# Patient Record
Sex: MALE | Race: OTHER | HISPANIC OR LATINO | URBAN - METROPOLITAN AREA
[De-identification: names, ages, dates, MRNs, and addresses within clinical notes are randomized per-mention and may not be internally consistent; named-entity substitution may affect disease eponyms.]

---

## 2017-09-07 ENCOUNTER — EMERGENCY (EMERGENCY)
Facility: HOSPITAL | Age: 42
LOS: 1 days | Discharge: HOME | End: 2017-09-07
Admitting: EMERGENCY MEDICINE
Payer: COMMERCIAL

## 2017-09-07 VITALS
OXYGEN SATURATION: 98 % | DIASTOLIC BLOOD PRESSURE: 73 MMHG | RESPIRATION RATE: 17 BRPM | SYSTOLIC BLOOD PRESSURE: 120 MMHG | TEMPERATURE: 98 F | HEART RATE: 72 BPM

## 2017-09-07 VITALS
SYSTOLIC BLOOD PRESSURE: 114 MMHG | OXYGEN SATURATION: 99 % | HEIGHT: 65 IN | TEMPERATURE: 98 F | HEART RATE: 65 BPM | WEIGHT: 164.91 LBS | RESPIRATION RATE: 14 BRPM | DIASTOLIC BLOOD PRESSURE: 67 MMHG
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
## 2017-09-07 DIAGNOSIS — Z98.890 OTHER SPECIFIED POSTPROCEDURAL STATES: Chronic | ICD-10-CM

## 2017-09-07 DIAGNOSIS — Z79.899 OTHER LONG TERM (CURRENT) DRUG THERAPY: ICD-10-CM

## 2017-09-07 DIAGNOSIS — M54.5 LOW BACK PAIN: ICD-10-CM
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
## 2017-09-07 DIAGNOSIS — S09.90XA UNSPECIFIED INJURY OF HEAD, INITIAL ENCOUNTER: ICD-10-CM

## 2017-09-07 DIAGNOSIS — Z79.1 LONG TERM (CURRENT) USE OF NON-STEROIDAL ANTI-INFLAMMATORIES (NSAID): ICD-10-CM
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
## 2017-09-07 PROCEDURE — 70450 CT HEAD/BRAIN W/O DYE: CPT | Mod: 26

## 2017-09-07 PROCEDURE — 99284 EMERGENCY DEPT VISIT MOD MDM: CPT | Mod: 25

## 2017-09-07 PROCEDURE — 96372 THER/PROPH/DIAG INJ SC/IM: CPT

## 2017-09-07 PROCEDURE — 70450 CT HEAD/BRAIN W/O DYE: CPT

## 2017-09-07 PROCEDURE — 99284 EMERGENCY DEPT VISIT MOD MDM: CPT

## 2017-09-07 RX ORDER — KETOROLAC TROMETHAMINE 30 MG/ML
60 SYRINGE (ML) INJECTION ONCE
Qty: 0 | Refills: 0 | Status: DISCONTINUED | OUTPATIENT
Start: 2017-09-07 | End: 2017-09-07

## 2017-09-07 RX ORDER — IBUPROFEN 200 MG
1 TABLET ORAL
Qty: 30 | Refills: 0
Start: 2017-09-07

## 2017-09-07 RX ADMIN — Medication 60 MILLIGRAM(S): at 20:07

## 2017-09-07 NOTE — ED PROVIDER NOTE - OBJECTIVE STATEMENT
43 y/o m hx TBI, seizures presents stating he tripped and fell backward today, hitting back of head on the ground.  Pt also stating he hit right jaw and has pain in left low back with movement.  Pt girlfriend stating his speech seemed different initially after fall, although now is normal.   Denies LOC, impaired gait, numbness/tingling, weakness, visual changes, all other ROS negative.
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
## 2017-09-07 NOTE — ED ADULT NURSE NOTE - CHPI ED SYMPTOMS NEG
no loss of consciousness/no dizziness/no change in level of consciousness/no fever/no weakness/no vomiting/no nausea/no confusion/no numbness/no blurred vision
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
## 2017-09-07 NOTE — ED PROVIDER NOTE - ENMT, MLM
Airway patent, Nasal mucosa clear. Mouth with normal mucosa. Throat has no vesicles, no oropharyngeal exudates and uvula is midline.  No dental tenderness, bite test neg
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
## 2018-08-21 ENCOUNTER — EMERGENCY (EMERGENCY)
Facility: HOSPITAL | Age: 43
LOS: 1 days | Discharge: HOME | End: 2018-08-21
Admitting: EMERGENCY MEDICINE
Payer: COMMERCIAL

## 2018-08-21 VITALS
TEMPERATURE: 98 F | HEART RATE: 70 BPM | RESPIRATION RATE: 16 BRPM | OXYGEN SATURATION: 99 % | DIASTOLIC BLOOD PRESSURE: 70 MMHG | SYSTOLIC BLOOD PRESSURE: 115 MMHG

## 2018-08-21 VITALS
TEMPERATURE: 98 F | HEART RATE: 77 BPM | DIASTOLIC BLOOD PRESSURE: 67 MMHG | RESPIRATION RATE: 17 BRPM | OXYGEN SATURATION: 98 % | SYSTOLIC BLOOD PRESSURE: 120 MMHG

## 2018-08-21 DIAGNOSIS — M79.671 PAIN IN RIGHT FOOT: ICD-10-CM

## 2018-08-21 DIAGNOSIS — Z23 ENCOUNTER FOR IMMUNIZATION: ICD-10-CM
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
## 2018-08-21 DIAGNOSIS — Z98.890 OTHER SPECIFIED POSTPROCEDURAL STATES: Chronic | ICD-10-CM
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
## 2018-08-21 DIAGNOSIS — S90.851A SUPERFICIAL FOREIGN BODY, RIGHT FOOT, INITIAL ENCOUNTER: ICD-10-CM
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
## 2018-08-21 DIAGNOSIS — Z79.1 LONG TERM (CURRENT) USE OF NON-STEROIDAL ANTI-INFLAMMATORIES (NSAID): ICD-10-CM

## 2018-08-21 DIAGNOSIS — Z79.899 OTHER LONG TERM (CURRENT) DRUG THERAPY: ICD-10-CM

## 2018-08-21 DIAGNOSIS — L08.9 LOCAL INFECTION OF THE SKIN AND SUBCUTANEOUS TISSUE, UNSPECIFIED: ICD-10-CM
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
## 2018-08-21 PROCEDURE — 73630 X-RAY EXAM OF FOOT: CPT | Mod: 26,RT

## 2018-08-21 PROCEDURE — 90471 IMMUNIZATION ADMIN: CPT

## 2018-08-21 PROCEDURE — 28190 REMOVAL OF FOOT FOREIGN BODY: CPT | Mod: RT

## 2018-08-21 PROCEDURE — 99284 EMERGENCY DEPT VISIT MOD MDM: CPT | Mod: 25

## 2018-08-21 PROCEDURE — 90715 TDAP VACCINE 7 YRS/> IM: CPT

## 2018-08-21 PROCEDURE — 99283 EMERGENCY DEPT VISIT LOW MDM: CPT | Mod: 25

## 2018-08-21 PROCEDURE — 73630 X-RAY EXAM OF FOOT: CPT

## 2018-08-21 RX ORDER — CEPHALEXIN 500 MG
1 CAPSULE ORAL
Qty: 21 | Refills: 0 | OUTPATIENT
Start: 2018-08-21 | End: 2018-08-27

## 2018-08-21 RX ORDER — TETANUS TOXOID, REDUCED DIPHTHERIA TOXOID AND ACELLULAR PERTUSSIS VACCINE, ADSORBED 5; 2.5; 8; 8; 2.5 [IU]/.5ML; [IU]/.5ML; UG/.5ML; UG/.5ML; UG/.5ML
0.5 SUSPENSION INTRAMUSCULAR ONCE
Qty: 0 | Refills: 0 | Status: COMPLETED | OUTPATIENT
Start: 2018-08-21 | End: 2018-08-21

## 2018-08-21 RX ORDER — CEPHALEXIN 500 MG
500 CAPSULE ORAL ONCE
Qty: 0 | Refills: 0 | Status: COMPLETED | OUTPATIENT
Start: 2018-08-21 | End: 2018-08-21

## 2018-08-21 RX ADMIN — Medication 500 MILLIGRAM(S): at 10:35

## 2018-08-21 RX ADMIN — TETANUS TOXOID, REDUCED DIPHTHERIA TOXOID AND ACELLULAR PERTUSSIS VACCINE, ADSORBED 0.5 MILLILITER(S): 5; 2.5; 8; 8; 2.5 SUSPENSION INTRAMUSCULAR at 09:13

## 2018-08-21 NOTE — ED PROVIDER NOTE - PSH
History of brain surgery Repair Performed By Another Provider Text (Leave Blank If You Do Not Want): After obtaining clear surgical margins the defect was repaired by another provider.

## 2018-08-21 NOTE — CONSULT NOTE ADULT - ASSESSMENT
A/P 42 yo M w PMHx seizures presents to ED with foreign body (glass)    - Assessed for area with maximum tenderness consistent with radiographic evidence of foreign body   - Area was anesthetized with 1% Lidocaine Plain in a ring block fashion  - Sterile field was set up around the patient's affected right foot  - Anesthetic was checked prior to commencement of procedure  - A Sterile #15 blade was utilized, making a small linear stab incision   - The glass was expressed from the superficial soft tissue   - No purulence was expressed from the surrounding area  - Right foot dressed in sterile gauze and Kerlix   - Dispensed surgical shoe to temporarily offload the area   - Patient tolerated procedure well  - Patient to follow up on outpatient basis in 1 week  - This will be arranged prior to discharge to find podiatrist   that accepts his insurance ([facility])
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
## 2018-08-21 NOTE — ED PROVIDER NOTE - MEDICAL DECISION MAKING DETAILS
Patient with right foot FB removed by podiatry and placed on oral abx therapy. Td was updated. Recommend f/u at podiatry clinic.

## 2018-08-21 NOTE — ED ADULT NURSE NOTE - OBJECTIVE STATEMENT
42 y/o male c/o right foot pain. pt reports stepping on glass. pt is observed to have bleb on bottom of right foot. Pt no active bleeding noted. Pt. speaks clear, MAEx4, has unlabored breathing. Abd soft nt nd. Skin dry, warm.

## 2018-08-21 NOTE — ED PROVIDER NOTE - PHYSICAL EXAMINATION
right foot + lateral aspect + mild swelling with localized erythema, No motor or sensory deficit, NVI. FROM

## 2018-08-21 NOTE — ED ADULT NURSE NOTE - NSIMPLEMENTINTERV_GEN_ALL_ED
Implemented All Universal Safety Interventions:  [place] to call system. Call bell, personal items and telephone within reach. Instruct patient to call for assistance. Room bathroom lighting operational. Non-slip footwear when patient is off stretcher. Physically safe environment: no spills, clutter or unnecessary equipment. Stretcher in lowest position, wheels locked, appropriate side rails in place.
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
## 2018-09-28 ENCOUNTER — EMERGENCY (EMERGENCY)
Facility: HOSPITAL | Age: 43
LOS: 1 days | Discharge: HOME | End: 2018-09-28
Attending: EMERGENCY MEDICINE | Admitting: EMERGENCY MEDICINE
Payer: COMMERCIAL

## 2018-09-28 VITALS
RESPIRATION RATE: 18 BRPM | TEMPERATURE: 98 F | HEART RATE: 75 BPM | SYSTOLIC BLOOD PRESSURE: 131 MMHG | WEIGHT: 159.39 LBS | HEIGHT: 65 IN | DIASTOLIC BLOOD PRESSURE: 82 MMHG | OXYGEN SATURATION: 97 %

## 2018-09-28 VITALS
DIASTOLIC BLOOD PRESSURE: 75 MMHG | HEART RATE: 72 BPM | RESPIRATION RATE: 16 BRPM | OXYGEN SATURATION: 94 % | TEMPERATURE: 98 F | SYSTOLIC BLOOD PRESSURE: 112 MMHG

## 2018-09-28 DIAGNOSIS — Z98.890 OTHER SPECIFIED POSTPROCEDURAL STATES: Chronic | ICD-10-CM

## 2018-09-28 LAB
ALBUMIN SERPL ELPH-MCNC: 4.7 G/DL — SIGNIFICANT CHANGE UP (ref 3.3–5)
ALP SERPL-CCNC: 97 U/L — SIGNIFICANT CHANGE UP (ref 40–120)
ALT FLD-CCNC: 20 U/L — SIGNIFICANT CHANGE UP (ref 10–45)
ANION GAP SERPL CALC-SCNC: 12 MMOL/L — SIGNIFICANT CHANGE UP (ref 5–17)
APPEARANCE UR: CLEAR — SIGNIFICANT CHANGE UP
AST SERPL-CCNC: 20 U/L — SIGNIFICANT CHANGE UP (ref 10–40)
BASOPHILS NFR BLD AUTO: 0 % — SIGNIFICANT CHANGE UP (ref 0–2)
BILIRUB SERPL-MCNC: 0.4 MG/DL — SIGNIFICANT CHANGE UP (ref 0.2–1.2)
BILIRUB UR-MCNC: NEGATIVE — SIGNIFICANT CHANGE UP
BUN SERPL-MCNC: 10 MG/DL — SIGNIFICANT CHANGE UP (ref 7–23)
CALCIUM SERPL-MCNC: 9.5 MG/DL — SIGNIFICANT CHANGE UP (ref 8.4–10.5)
CARBAMAZEPINE SERPL-MCNC: 10.3 UG/ML — SIGNIFICANT CHANGE UP (ref 4–12)
CHLORIDE SERPL-SCNC: 98 MMOL/L — SIGNIFICANT CHANGE UP (ref 96–108)
CHOLEST SERPL-MCNC: 167 MG/DL — SIGNIFICANT CHANGE UP (ref 10–199)
CO2 SERPL-SCNC: 26 MMOL/L — SIGNIFICANT CHANGE UP (ref 22–31)
COLOR SPEC: YELLOW — SIGNIFICANT CHANGE UP
CREAT SERPL-MCNC: 0.71 MG/DL — SIGNIFICANT CHANGE UP (ref 0.5–1.3)
DIFF PNL FLD: NEGATIVE — SIGNIFICANT CHANGE UP
EOSINOPHIL NFR BLD AUTO: 2 % — SIGNIFICANT CHANGE UP (ref 0–6)
GLUCOSE SERPL-MCNC: 112 MG/DL — HIGH (ref 70–99)
GLUCOSE UR QL: NEGATIVE — SIGNIFICANT CHANGE UP
HCT VFR BLD CALC: 42.8 % — SIGNIFICANT CHANGE UP (ref 39–50)
HDLC SERPL-MCNC: 49 MG/DL — SIGNIFICANT CHANGE UP
HGB BLD-MCNC: 14.8 G/DL — SIGNIFICANT CHANGE UP (ref 13–17)
KETONES UR-MCNC: NEGATIVE — SIGNIFICANT CHANGE UP
LEUKOCYTE ESTERASE UR-ACNC: NEGATIVE — SIGNIFICANT CHANGE UP
LIPID PNL WITH DIRECT LDL SERPL: 85 MG/DL — SIGNIFICANT CHANGE UP
LYMPHOCYTES # BLD AUTO: 24 % — SIGNIFICANT CHANGE UP (ref 13–44)
MCHC RBC-ENTMCNC: 30.7 PG — SIGNIFICANT CHANGE UP (ref 27–34)
MCHC RBC-ENTMCNC: 34.6 G/DL — SIGNIFICANT CHANGE UP (ref 32–36)
MCV RBC AUTO: 88.8 FL — SIGNIFICANT CHANGE UP (ref 80–100)
MONOCYTES NFR BLD AUTO: 1 % — LOW (ref 2–14)
NEUTROPHILS NFR BLD AUTO: 66 % — SIGNIFICANT CHANGE UP (ref 43–77)
NITRITE UR-MCNC: NEGATIVE — SIGNIFICANT CHANGE UP
PH UR: 6.5 — SIGNIFICANT CHANGE UP (ref 5–8)
PHENOBARB SERPL-MCNC: 21.1 UG/ML — SIGNIFICANT CHANGE UP (ref 15–40)
PLATELET # BLD AUTO: 280 K/UL — SIGNIFICANT CHANGE UP (ref 150–400)
POTASSIUM SERPL-MCNC: 4 MMOL/L — SIGNIFICANT CHANGE UP (ref 3.5–5.3)
POTASSIUM SERPL-SCNC: 4 MMOL/L — SIGNIFICANT CHANGE UP (ref 3.5–5.3)
PROT SERPL-MCNC: 8.2 G/DL — SIGNIFICANT CHANGE UP (ref 6–8.3)
PROT UR-MCNC: NEGATIVE MG/DL — SIGNIFICANT CHANGE UP
RBC # BLD: 4.82 M/UL — SIGNIFICANT CHANGE UP (ref 4.2–5.8)
RBC # FLD: 12.8 % — SIGNIFICANT CHANGE UP (ref 10.3–16.9)
SODIUM SERPL-SCNC: 136 MMOL/L — SIGNIFICANT CHANGE UP (ref 135–145)
SP GR SPEC: <=1.005 — SIGNIFICANT CHANGE UP (ref 1–1.03)
TOTAL CHOLESTEROL/HDL RATIO MEASUREMENT: 3.4 RATIO — SIGNIFICANT CHANGE UP (ref 3.4–9.6)
TRIGL SERPL-MCNC: 167 MG/DL — HIGH (ref 10–149)
TROPONIN T SERPL-MCNC: <0.01 NG/ML — SIGNIFICANT CHANGE UP (ref 0–0.01)
UROBILINOGEN FLD QL: 0.2 E.U./DL — SIGNIFICANT CHANGE UP
WBC # BLD: 5.1 K/UL — SIGNIFICANT CHANGE UP (ref 3.8–10.5)
WBC # FLD AUTO: 5.1 K/UL — SIGNIFICANT CHANGE UP (ref 3.8–10.5)

## 2018-09-28 PROCEDURE — 70450 CT HEAD/BRAIN W/O DYE: CPT

## 2018-09-28 PROCEDURE — 80156 ASSAY CARBAMAZEPINE TOTAL: CPT

## 2018-09-28 PROCEDURE — 93971 EXTREMITY STUDY: CPT

## 2018-09-28 PROCEDURE — 80061 LIPID PANEL: CPT

## 2018-09-28 PROCEDURE — 81003 URINALYSIS AUTO W/O SCOPE: CPT

## 2018-09-28 PROCEDURE — 70496 CT ANGIOGRAPHY HEAD: CPT | Mod: 26

## 2018-09-28 PROCEDURE — 71045 X-RAY EXAM CHEST 1 VIEW: CPT

## 2018-09-28 PROCEDURE — 73502 X-RAY EXAM HIP UNI 2-3 VIEWS: CPT

## 2018-09-28 PROCEDURE — 99285 EMERGENCY DEPT VISIT HI MDM: CPT | Mod: 25

## 2018-09-28 PROCEDURE — 93971 EXTREMITY STUDY: CPT | Mod: 26,RT

## 2018-09-28 PROCEDURE — 80184 ASSAY OF PHENOBARBITAL: CPT

## 2018-09-28 PROCEDURE — 36415 COLL VENOUS BLD VENIPUNCTURE: CPT

## 2018-09-28 PROCEDURE — 93926 LOWER EXTREMITY STUDY: CPT

## 2018-09-28 PROCEDURE — 70498 CT ANGIOGRAPHY NECK: CPT | Mod: 26

## 2018-09-28 PROCEDURE — 71045 X-RAY EXAM CHEST 1 VIEW: CPT | Mod: 26

## 2018-09-28 PROCEDURE — 70450 CT HEAD/BRAIN W/O DYE: CPT | Mod: 26,59

## 2018-09-28 PROCEDURE — 93926 LOWER EXTREMITY STUDY: CPT | Mod: 26,RT

## 2018-09-28 PROCEDURE — 73502 X-RAY EXAM HIP UNI 2-3 VIEWS: CPT | Mod: 26,RT

## 2018-09-28 PROCEDURE — 0042T: CPT

## 2018-09-28 PROCEDURE — 93010 ELECTROCARDIOGRAM REPORT: CPT

## 2018-09-28 PROCEDURE — 85025 COMPLETE CBC W/AUTO DIFF WBC: CPT

## 2018-09-28 PROCEDURE — 84484 ASSAY OF TROPONIN QUANT: CPT

## 2018-09-28 PROCEDURE — 87086 URINE CULTURE/COLONY COUNT: CPT

## 2018-09-28 PROCEDURE — 99284 EMERGENCY DEPT VISIT MOD MDM: CPT | Mod: 25

## 2018-09-28 PROCEDURE — 70496 CT ANGIOGRAPHY HEAD: CPT

## 2018-09-28 PROCEDURE — 82962 GLUCOSE BLOOD TEST: CPT

## 2018-09-28 PROCEDURE — 70498 CT ANGIOGRAPHY NECK: CPT

## 2018-09-28 PROCEDURE — 93005 ELECTROCARDIOGRAM TRACING: CPT

## 2018-09-28 PROCEDURE — 80053 COMPREHEN METABOLIC PANEL: CPT
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
## 2018-09-28 NOTE — ED PROVIDER NOTE - PROGRESS NOTE DETAILS
Pt was seen by US chapis ashraf. Pt needs to f/u with podiatry or ortho.   The patient is stable for DC. They were advised to call their PMD for prompt outpatient follow up. Return precautions were discussed. The patient was advised to return to the ER for any concerning or worsening symptoms.

## 2018-09-28 NOTE — ED PROVIDER NOTE - MEDICAL DECISION MAKING DETAILS
Pt seen by stroke team dr. rodriguez who feel presentation and Cts not c/w stroke. Will get RLE US to r/o DVT and vascular was consulted. Also right hip xray ordered as pt c/o h/i hip pain and pain intermittently. Dispo pending.
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
## 2018-09-28 NOTE — CONSULT NOTE ADULT - ASSESSMENT
42 yo M with right lower leg numbness. Right lower extremity if well perfused with palpable pulses. Low suspicion for acute critical limb ischemia given pulses and absence of pain.    Most likely neuropathy 2/2 nerve compression with progressing foot deformity, possible early presentation of Charcot foot.    Pending discussion with attending on call 44 yo M with right lower leg numbness. Right lower extremity if well perfused with palpable pulses. Low suspicion for acute critical limb ischemia given pulses and absence of pain.    Most likely neuropathy 2/2 nerve compression with progressing foot deformity.    Pending discussion with attending on call 42 yo M with right lower leg numbness. Right lower extremity if well perfused with palpable pulses. Low suspicion for acute critical limb ischemia given pulses and absence of pain. Arterial duplex negative for occlusion/disease and venous duplex negative for DVT.    Most likely neuropathy 2/2 nerve compression with progressing foot deformity.    No further vascular intervention indicated at this time  Would recommend podiatry vs ortho consult/follow up for right foot deformity and fitting for better suited, offloading shoe.   Consider neurology follow up for nerve testing.      Case d/w chief on call and attending.

## 2018-09-28 NOTE — ED ADULT NURSE NOTE - NSIMPLEMENTINTERV_GEN_ALL_ED
Implemented All Universal Safety Interventions:  [place] to call system. Call bell, personal items and telephone within reach. Instruct patient to call for assistance. Room bathroom lighting operational. Non-slip footwear when patient is off stretcher. Physically safe environment: no spills, clutter or unnecessary equipment. Stretcher in lowest position, wheels locked, appropriate side rails in place.

## 2018-09-28 NOTE — ED PROVIDER NOTE - CONDUCTED A DETAILED DISCUSSION WITH PATIENT AND/OR GUARDIAN REGARDING, MDM
radiology results/return to ED if symptoms worsen, persist or questions arise/lab results/need for outpatient follow-up 55 years
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
## 2018-09-28 NOTE — ED PROVIDER NOTE - PHYSICAL EXAMINATION
GEN: Well appearing, well nourished, awake, alert, oriented to person, place, time/situation and in no apparent distress.  ENT: Airway patent, Nasal mucosa clear. Mouth with normal mucosa.  EYES: Clear bilaterally.  RESPIRATORY: Breathing comfortably with normal RR.  CARDIAC: Regular rate and rhythm  ABDOMEN: Soft, nontender, +bowel sounds, no rebound, rigidity, or guarding.  MSK: Right UE contraction and RLE baseline deformity, +2 pulses distally, compartments soft.  NEURO: Alert and oriented x 3. Right facial droop (old), decreased stregthj right side and patient reports decreased sensation from knee down on RLE. Pt is ambulatory.  SKIN: Skin normal color for race, warm, dry and intact. No evidence of rash.  PSYCH: Alert and oriented to person, place, time/situation. normal mood and affect. no apparent risk to self or others. GEN: Well appearing, well nourished, awake, alert, oriented to person, place, time/situation and in no apparent distress.  ENT: Airway patent, Nasal mucosa clear. Mouth with normal mucosa.  EYES: Clear bilaterally.  RESPIRATORY: Breathing comfortably with normal RR.  CARDIAC: Regular rate and rhythm  ABDOMEN: Soft, nontender, +bowel sounds, no rebound, rigidity, or guarding.  MSK: Right UE contraction and RLE baseline deformity, +2 pulses distally, compartments soft.  NEURO: Alert and oriented x 3. Right facial droop (old), decreased strength right side and patient reports decreased sensation from knee down on RLE. Pt is ambulatory.  SKIN: Skin normal color for race, warm, dry and intact. No evidence of rash.  PSYCH: Alert and oriented to person, place, time/situation. normal mood and affect. no apparent risk to self or others.
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
## 2018-09-29 LAB
CULTURE RESULTS: SIGNIFICANT CHANGE UP
SPECIMEN SOURCE: SIGNIFICANT CHANGE UP

## 2018-10-02 DIAGNOSIS — R20.0 ANESTHESIA OF SKIN: ICD-10-CM

## 2018-10-02 DIAGNOSIS — Z79.899 OTHER LONG TERM (CURRENT) DRUG THERAPY: ICD-10-CM

## 2018-10-02 DIAGNOSIS — R20.2 PARESTHESIA OF SKIN: ICD-10-CM

## 2018-10-02 DIAGNOSIS — Z79.1 LONG TERM (CURRENT) USE OF NON-STEROIDAL ANTI-INFLAMMATORIES (NSAID): ICD-10-CM

## 2019-01-15 ENCOUNTER — INPATIENT (INPATIENT)
Facility: HOSPITAL | Age: 44
LOS: 0 days | Discharge: HOME | DRG: 101 | End: 2019-01-16
Attending: PSYCHIATRY & NEUROLOGY | Admitting: PSYCHIATRY & NEUROLOGY
Payer: COMMERCIAL

## 2019-01-15 VITALS
TEMPERATURE: 98 F | WEIGHT: 154.98 LBS | SYSTOLIC BLOOD PRESSURE: 128 MMHG | DIASTOLIC BLOOD PRESSURE: 84 MMHG | RESPIRATION RATE: 18 BRPM | OXYGEN SATURATION: 98 % | HEART RATE: 87 BPM

## 2019-01-15 DIAGNOSIS — Z98.890 OTHER SPECIFIED POSTPROCEDURAL STATES: Chronic | ICD-10-CM

## 2019-01-15 DIAGNOSIS — Z91.89 OTHER SPECIFIED PERSONAL RISK FACTORS, NOT ELSEWHERE CLASSIFIED: ICD-10-CM

## 2019-01-15 DIAGNOSIS — R56.9 UNSPECIFIED CONVULSIONS: ICD-10-CM

## 2019-01-15 DIAGNOSIS — R63.8 OTHER SYMPTOMS AND SIGNS CONCERNING FOOD AND FLUID INTAKE: ICD-10-CM

## 2019-01-15 DIAGNOSIS — Z29.9 ENCOUNTER FOR PROPHYLACTIC MEASURES, UNSPECIFIED: ICD-10-CM

## 2019-01-15 LAB
ALBUMIN SERPL ELPH-MCNC: 4.4 G/DL — SIGNIFICANT CHANGE UP (ref 3.3–5)
ALP SERPL-CCNC: SIGNIFICANT CHANGE UP U/L (ref 40–120)
ALT FLD-CCNC: SIGNIFICANT CHANGE UP U/L (ref 10–45)
ANION GAP SERPL CALC-SCNC: 14 MMOL/L — SIGNIFICANT CHANGE UP (ref 5–17)
AST SERPL-CCNC: SIGNIFICANT CHANGE UP U/L (ref 10–40)
BASOPHILS NFR BLD AUTO: 0.2 % — SIGNIFICANT CHANGE UP (ref 0–2)
BILIRUB SERPL-MCNC: 0.2 MG/DL — SIGNIFICANT CHANGE UP (ref 0.2–1.2)
BUN SERPL-MCNC: 6 MG/DL — LOW (ref 7–23)
CALCIUM SERPL-MCNC: 8.8 MG/DL — SIGNIFICANT CHANGE UP (ref 8.4–10.5)
CARBAMAZEPINE SERPL-MCNC: 9.9 UG/ML — SIGNIFICANT CHANGE UP (ref 4–12)
CHLORIDE SERPL-SCNC: 95 MMOL/L — LOW (ref 96–108)
CO2 SERPL-SCNC: 24 MMOL/L — SIGNIFICANT CHANGE UP (ref 22–31)
CREAT SERPL-MCNC: 0.59 MG/DL — SIGNIFICANT CHANGE UP (ref 0.5–1.3)
EOSINOPHIL NFR BLD AUTO: 3.2 % — SIGNIFICANT CHANGE UP (ref 0–6)
GLUCOSE SERPL-MCNC: 98 MG/DL — SIGNIFICANT CHANGE UP (ref 70–99)
HCT VFR BLD CALC: 38.3 % — LOW (ref 39–50)
HGB BLD-MCNC: 13.1 G/DL — SIGNIFICANT CHANGE UP (ref 13–17)
LYMPHOCYTES # BLD AUTO: 20.4 % — SIGNIFICANT CHANGE UP (ref 13–44)
MCHC RBC-ENTMCNC: 30 PG — SIGNIFICANT CHANGE UP (ref 27–34)
MCHC RBC-ENTMCNC: 34.2 G/DL — SIGNIFICANT CHANGE UP (ref 32–36)
MCV RBC AUTO: 87.6 FL — SIGNIFICANT CHANGE UP (ref 80–100)
MONOCYTES NFR BLD AUTO: 7.5 % — SIGNIFICANT CHANGE UP (ref 2–14)
NEUTROPHILS NFR BLD AUTO: 68.7 % — SIGNIFICANT CHANGE UP (ref 43–77)
PHENOBARB SERPL-MCNC: 15.3 UG/ML — SIGNIFICANT CHANGE UP (ref 15–40)
PLATELET # BLD AUTO: 319 K/UL — SIGNIFICANT CHANGE UP (ref 150–400)
POTASSIUM SERPL-MCNC: SIGNIFICANT CHANGE UP MMOL/L (ref 3.5–5.3)
POTASSIUM SERPL-SCNC: SIGNIFICANT CHANGE UP MMOL/L (ref 3.5–5.3)
PROT SERPL-MCNC: 8.2 G/DL — SIGNIFICANT CHANGE UP (ref 6–8.3)
RBC # BLD: 4.37 M/UL — SIGNIFICANT CHANGE UP (ref 4.2–5.8)
RBC # FLD: 12.9 % — SIGNIFICANT CHANGE UP (ref 10.3–16.9)
SODIUM SERPL-SCNC: 133 MMOL/L — LOW (ref 135–145)
WBC # BLD: 6 K/UL — SIGNIFICANT CHANGE UP (ref 3.8–10.5)
WBC # FLD AUTO: 6 K/UL — SIGNIFICANT CHANGE UP (ref 3.8–10.5)

## 2019-01-15 PROCEDURE — 93010 ELECTROCARDIOGRAM REPORT: CPT

## 2019-01-15 PROCEDURE — 71046 X-RAY EXAM CHEST 2 VIEWS: CPT | Mod: 26

## 2019-01-15 PROCEDURE — 99223 1ST HOSP IP/OBS HIGH 75: CPT

## 2019-01-15 PROCEDURE — 99285 EMERGENCY DEPT VISIT HI MDM: CPT | Mod: 25

## 2019-01-15 RX ORDER — PHENOBARBITAL 60 MG
115 TABLET ORAL AT BEDTIME
Qty: 0 | Refills: 0 | Status: DISCONTINUED | OUTPATIENT
Start: 2019-01-15 | End: 2019-01-16

## 2019-01-15 RX ORDER — CARBAMAZEPINE 200 MG
400 TABLET ORAL
Qty: 0 | Refills: 0 | Status: DISCONTINUED | OUTPATIENT
Start: 2019-01-15 | End: 2019-01-16

## 2019-01-15 RX ORDER — LEVETIRACETAM 250 MG/1
750 TABLET, FILM COATED ORAL
Qty: 0 | Refills: 0 | Status: DISCONTINUED | OUTPATIENT
Start: 2019-01-15 | End: 2019-01-16

## 2019-01-15 RX ORDER — SODIUM CHLORIDE 9 MG/ML
1000 INJECTION INTRAMUSCULAR; INTRAVENOUS; SUBCUTANEOUS ONCE
Qty: 0 | Refills: 0 | Status: COMPLETED | OUTPATIENT
Start: 2019-01-15 | End: 2019-01-15

## 2019-01-15 RX ADMIN — Medication 115 MILLIGRAM(S): at 23:48

## 2019-01-15 RX ADMIN — SODIUM CHLORIDE 2000 MILLILITER(S): 9 INJECTION INTRAMUSCULAR; INTRAVENOUS; SUBCUTANEOUS at 13:18

## 2019-01-15 RX ADMIN — Medication 400 MILLIGRAM(S): at 19:31

## 2019-01-15 RX ADMIN — LEVETIRACETAM 750 MILLIGRAM(S): 250 TABLET, FILM COATED ORAL at 19:31
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
## 2019-01-15 NOTE — H&P ADULT - NSHPPHYSICALEXAM_GEN_ALL_CORE
.  VITAL SIGNS:  T(F): 98.4 (01-15-19 @ 17:06), Max: 98.5 (01-15-19 @ 12:02)  HR: 76 (01-15-19 @ 17:06) (66 - 87)  BP: 118/64 (01-15-19 @ 17:06) (118/64 - 136/81)  BP(mean): --  RR: 18 (01-15-19 @ 17:06) (18 - 18)  SpO2: 97% (01-15-19 @ 17:06) (97% - 98%)    PHYSICAL EXAM:    Constitutional: WDWN resting comfortably in bed; NAD  HEENT: Large concave depression on left side of head consistent with hx, eyes with anicteric sclera, no nasal discharge; uvula midline, no oropharyngeal erythema or exudates; MMM  Neck: supple; no JVD or thyromegaly  Respiratory: CTA B/L; no W/R/R, no retractions  Cardiac: +S1/S2; RRR; no M/R/G; PMI non-displaced  Gastrointestinal: soft, NT/ND; no rebound or guarding; +BSx4  Genitourinary: normal external genitalia  Back: spine midline, no bony tenderness or step-offs; no CVAT B/L  Extremities: WWP, no clubbing or cyanosis; no peripheral edema  Musculoskeletal: NROM x4; no joint swelling, tenderness or erythema  Vascular: 2+ radial, femoral, DP/PT pulses B/L  Dermatologic: skin warm, dry and intact; no rashes, wounds, or scars  Lymphatic: no submandibular or cervical LAD  Neurologic: AAOx3; CNII-XII grossly intact; Right hand contracted 4/5 strenth compared to left, walks with shuffling gait favoring right side.   Psychiatric: affect and characteristics of appearance, verbalizations, behaviors are appropriate, denies SI/HI/AH/VH

## 2019-01-15 NOTE — H&P ADULT - NSHPSOCIALHISTORY_GEN_ALL_CORE
Lives with: Spouse at bed side  Alcohol: (X ) none  ( ) occasional ( ) 2-3 times a week ( ) daily; Last drink:   History of DTs:   Tobacco usage: (X ) never smoked   ( ) former smoker  ( ) current smoker; Packs per day:   Drug Use: (X )Never ( ) IVDU ( ) Illicit drug use:   Marital Status: Wife

## 2019-01-15 NOTE — ED ADULT NURSE NOTE - NSIMPLEMENTINTERV_GEN_ALL_ED
Implemented All Universal Safety Interventions:  [place] to call system. Call bell, personal items and telephone within reach. Instruct patient to call for assistance. Room bathroom lighting operational. Non-slip footwear when patient is off stretcher. Physically safe environment: no spills, clutter or unnecessary equipment. Stretcher in lowest position, wheels locked, appropriate side rails in place.

## 2019-01-15 NOTE — ED PROVIDER NOTE - DIAGNOSTIC INTERPRETATION
CXR wet read: The heart appears to be within normal limits in transverse diameter.  No acute infiltrates, effusions or evidence of pulmonary vascular congestion.  The chest wall and surrounding bony structures appear normal. CXR wet read: The heart appears to be within normal limits in transverse diameter.  No acute infiltrates, effusions or evidence of pulmonary vascular congestion. some left base atelectasis noted. The chest wall and surrounding bony structures appear normal.
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
## 2019-01-15 NOTE — H&P ADULT - PROBLEM SELECTOR PLAN 4
1.       PCP Contacted on Admission: (Y/N) --> Name & Phone #:  2.       Date of Contact with PCP:  3.       PCP Contacted at Discharge: (Y/N)  4.       Summary of Handoff Given to PCP:  5.       Post-Discharge Appointment Date and Location: [facility]
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
## 2019-01-15 NOTE — ED ADULT NURSE NOTE - CHPI ED NUR SYMPTOMS NEG
no vomiting/no blurred vision/no dizziness/no change in level of consciousness/no weakness/no fever/no nausea/no numbness/no confusion/no loss of consciousness

## 2019-01-15 NOTE — H&P ADULT - NSHPLABSRESULTS_GEN_ALL_CORE
.  LABS:                         13.1   6.0   )-----------( 319      ( 15 Ming 2019 13:14 )             38.3     01-15    133<L>  |  95<L>  |  6<L>  ----------------------------<  98  see note   |  24  |  0.59    Ca    8.8      15 Ming 2019 13:14    TPro  8.2  /  Alb  4.4  /  TBili  0.2  /  DBili  x   /  AST  see note  /  ALT  see note  /  AlkPhos  see note  01-15                  RADIOLOGY, EKG & ADDITIONAL TESTS: Reviewed.

## 2019-01-15 NOTE — CONSULT NOTE ADULT - ATTENDING COMMENTS
Repetitive,  semirhythmic ?myoclonus     involving  the   R   UE    was   witnessed.  Will  admit    the   patient to  EMU  Continue   current  meds

## 2019-01-15 NOTE — H&P ADULT - NSHPREVIEWOFSYSTEMS_GEN_ALL_CORE
REVIEW OF SYSTEMS:    CONSTITUTIONAL: Patient denies weakness, fevers or chills  EYES/ENT: Patient denies visual changes;  denies vertigo or throat pain   NECK: Patient denies pain or stiffness  RESPIRATORY: As in HPI  CARDIOVASCULAR: Patient denies chest pain or palpitations  GASTROINTESTINAL: Patient denies abdominal or epigastric pain, nausea, vomiting, or hematemesis, diarrhea or constipation, melena or hematochezia.  GENITOURINARY: Patient denies dysuria, frequency or hematuria  NEUROLOGICAL: Patient denies numbness or weakness, Seizure as above  SKIN: Patient denies itching, burning, rashes, or lesions   All other review of systems is negative unless indicated above.
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
## 2019-01-15 NOTE — ED ADULT NURSE NOTE - OBJECTIVE STATEMENT
The pt is a 42 y/o male who came in to ED for evaluation ofinvolu The pt is a 44 y/o male who came in to ED for evaluation of involuntary right arm movement. Pt reports that he has missed a couple of doses of his seizure medications. Pt reports that he called his neurologist and was told to come in to ED for evaluation.

## 2019-01-15 NOTE — H&P ADULT - PROBLEM SELECTOR PLAN 1
Patient was taking cough medication that included dextromethorphan and possibly diphenhydramine which may lower seizure threshold, as well as illness  - Follow up RVP  - Continue home meds
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
## 2019-01-16 ENCOUNTER — TRANSCRIPTION ENCOUNTER (OUTPATIENT)
Age: 44
End: 2019-01-16

## 2019-01-16 VITALS
TEMPERATURE: 98 F | RESPIRATION RATE: 15 BRPM | DIASTOLIC BLOOD PRESSURE: 70 MMHG | HEART RATE: 86 BPM | SYSTOLIC BLOOD PRESSURE: 123 MMHG | OXYGEN SATURATION: 97 %

## 2019-01-16 LAB
ALBUMIN SERPL ELPH-MCNC: 4.1 G/DL — SIGNIFICANT CHANGE UP (ref 3.3–5)
ALP SERPL-CCNC: 92 U/L — SIGNIFICANT CHANGE UP (ref 40–120)
ALT FLD-CCNC: 17 U/L — SIGNIFICANT CHANGE UP (ref 10–45)
ANION GAP SERPL CALC-SCNC: 11 MMOL/L — SIGNIFICANT CHANGE UP (ref 5–17)
AST SERPL-CCNC: 13 U/L — SIGNIFICANT CHANGE UP (ref 10–40)
BASOPHILS NFR BLD AUTO: 0.7 % — SIGNIFICANT CHANGE UP (ref 0–2)
BILIRUB SERPL-MCNC: 0.3 MG/DL — SIGNIFICANT CHANGE UP (ref 0.2–1.2)
BUN SERPL-MCNC: 7 MG/DL — SIGNIFICANT CHANGE UP (ref 7–23)
CALCIUM SERPL-MCNC: 8.7 MG/DL — SIGNIFICANT CHANGE UP (ref 8.4–10.5)
CHLORIDE SERPL-SCNC: 102 MMOL/L — SIGNIFICANT CHANGE UP (ref 96–108)
CK SERPL-CCNC: 84 U/L — SIGNIFICANT CHANGE UP (ref 30–200)
CO2 SERPL-SCNC: 26 MMOL/L — SIGNIFICANT CHANGE UP (ref 22–31)
CREAT SERPL-MCNC: 0.71 MG/DL — SIGNIFICANT CHANGE UP (ref 0.5–1.3)
EOSINOPHIL NFR BLD AUTO: 4.8 % — SIGNIFICANT CHANGE UP (ref 0–6)
GLUCOSE SERPL-MCNC: 95 MG/DL — SIGNIFICANT CHANGE UP (ref 70–99)
HCT VFR BLD CALC: 39.7 % — SIGNIFICANT CHANGE UP (ref 39–50)
HGB BLD-MCNC: 13.2 G/DL — SIGNIFICANT CHANGE UP (ref 13–17)
LYMPHOCYTES # BLD AUTO: 30.5 % — SIGNIFICANT CHANGE UP (ref 13–44)
MAGNESIUM SERPL-MCNC: 2.4 MG/DL — SIGNIFICANT CHANGE UP (ref 1.6–2.6)
MCHC RBC-ENTMCNC: 29.5 PG — SIGNIFICANT CHANGE UP (ref 27–34)
MCHC RBC-ENTMCNC: 33.2 G/DL — SIGNIFICANT CHANGE UP (ref 32–36)
MCV RBC AUTO: 88.8 FL — SIGNIFICANT CHANGE UP (ref 80–100)
MONOCYTES NFR BLD AUTO: 7.9 % — SIGNIFICANT CHANGE UP (ref 2–14)
NEUTROPHILS NFR BLD AUTO: 56.1 % — SIGNIFICANT CHANGE UP (ref 43–77)
PHOSPHATE SERPL-MCNC: 2.5 MG/DL — SIGNIFICANT CHANGE UP (ref 2.5–4.5)
PLATELET # BLD AUTO: 284 K/UL — SIGNIFICANT CHANGE UP (ref 150–400)
POTASSIUM SERPL-MCNC: 3.8 MMOL/L — SIGNIFICANT CHANGE UP (ref 3.5–5.3)
POTASSIUM SERPL-SCNC: 3.8 MMOL/L — SIGNIFICANT CHANGE UP (ref 3.5–5.3)
PROT SERPL-MCNC: 7.2 G/DL — SIGNIFICANT CHANGE UP (ref 6–8.3)
RAPID RVP RESULT: SIGNIFICANT CHANGE UP
RBC # BLD: 4.47 M/UL — SIGNIFICANT CHANGE UP (ref 4.2–5.8)
RBC # FLD: 13 % — SIGNIFICANT CHANGE UP (ref 10.3–16.9)
SODIUM SERPL-SCNC: 139 MMOL/L — SIGNIFICANT CHANGE UP (ref 135–145)
WBC # BLD: 4.2 K/UL — SIGNIFICANT CHANGE UP (ref 3.8–10.5)
WBC # FLD AUTO: 4.2 K/UL — SIGNIFICANT CHANGE UP (ref 3.8–10.5)

## 2019-01-16 PROCEDURE — 87486 CHLMYD PNEUM DNA AMP PROBE: CPT

## 2019-01-16 PROCEDURE — 82550 ASSAY OF CK (CPK): CPT

## 2019-01-16 PROCEDURE — 87798 DETECT AGENT NOS DNA AMP: CPT

## 2019-01-16 PROCEDURE — 80177 DRUG SCRN QUAN LEVETIRACETAM: CPT

## 2019-01-16 PROCEDURE — 99285 EMERGENCY DEPT VISIT HI MDM: CPT | Mod: 25

## 2019-01-16 PROCEDURE — 83735 ASSAY OF MAGNESIUM: CPT

## 2019-01-16 PROCEDURE — 80156 ASSAY CARBAMAZEPINE TOTAL: CPT

## 2019-01-16 PROCEDURE — 85025 COMPLETE CBC W/AUTO DIFF WBC: CPT

## 2019-01-16 PROCEDURE — 95951: CPT | Mod: 26

## 2019-01-16 PROCEDURE — 87633 RESP VIRUS 12-25 TARGETS: CPT

## 2019-01-16 PROCEDURE — 80053 COMPREHEN METABOLIC PANEL: CPT

## 2019-01-16 PROCEDURE — 84100 ASSAY OF PHOSPHORUS: CPT

## 2019-01-16 PROCEDURE — 71046 X-RAY EXAM CHEST 2 VIEWS: CPT

## 2019-01-16 PROCEDURE — 87581 M.PNEUMON DNA AMP PROBE: CPT

## 2019-01-16 PROCEDURE — 80184 ASSAY OF PHENOBARBITAL: CPT

## 2019-01-16 PROCEDURE — 99222 1ST HOSP IP/OBS MODERATE 55: CPT

## 2019-01-16 PROCEDURE — 36415 COLL VENOUS BLD VENIPUNCTURE: CPT

## 2019-01-16 PROCEDURE — 93005 ELECTROCARDIOGRAM TRACING: CPT

## 2019-01-16 RX ORDER — POTASSIUM CHLORIDE 20 MEQ
20 PACKET (EA) ORAL ONCE
Qty: 0 | Refills: 0 | Status: COMPLETED | OUTPATIENT
Start: 2019-01-16 | End: 2019-01-16

## 2019-01-16 RX ADMIN — Medication 400 MILLIGRAM(S): at 06:45

## 2019-01-16 RX ADMIN — LEVETIRACETAM 750 MILLIGRAM(S): 250 TABLET, FILM COATED ORAL at 06:44

## 2019-01-16 RX ADMIN — Medication 20 MILLIEQUIVALENT(S): at 09:07

## 2019-01-16 NOTE — DISCHARGE NOTE ADULT - MEDICATION SUMMARY - MEDICATIONS TO TAKE
I will START or STAY ON the medications listed below when I get home from the hospital:     mg oral tablet  -- 1 tab(s) by mouth every 8 hours, As Needed - for moderate pain  -- Do not take this drug if you are pregnant.  It is very important that you take or use this exactly as directed.  Do not skip doses or discontinue unless directed by your doctor.  May cause drowsiness or dizziness.  Obtain medical advice before taking any non-prescription drugs as some may affect the action of this medication.  Take with food or milk.    -- Indication: For pain    PHENobarbital  -- 97 milligram(s) by mouth once a day (at bedtime)  -- Indication: For epilepsy    TEGretol  -- 400 milligram(s) by mouth 2 times a day  -- Indication: For epilepsy    Keppra 750 mg oral tablet  -- 1 tab(s) by mouth 2 times a day  -- Indication: For epilepsy

## 2019-01-16 NOTE — DISCHARGE NOTE ADULT - CARE PLAN
Principal Discharge DX:	Seizure  Goal:	Please do not take any medications that have benadryl or phenylephrine in them, such as cough syrups, because these can lower the concentration of epilepsy medications in your body and make you more prone to seizures. Please go to your appointment with your neurologist on February 15 at 1230am.  Assessment and plan of treatment:	You came to the hospital because you were having hand jerks since yesterday morning. You were monitored for seizures on video EEG. You were not found to have any seizures. Principal Discharge DX:	Seizure  Goal:	Please do not take any medications that have benadryl or phenylephrine in them, such as cough syrups, because these can lower the concentration of epilepsy medications in your body and make you more prone to seizures. Please go to your appointment with your neurologist on February 15 at 1230pm  Assessment and plan of treatment:	You came to the hospital because you were having hand jerks since yesterday morning. You were monitored for seizures on video EEG. You were not found to have any seizures.

## 2019-01-16 NOTE — DISCHARGE NOTE ADULT - CARE PROVIDER_API CALL
Morena Loya  [facility] [place]  [address] [address]  Phone: [phone]  Fax: (   )    -

## 2019-01-16 NOTE — DISCHARGE NOTE ADULT - PATIENT PORTAL LINK FT
You can access the [facility] Patient Portal, offered by [facility], by registering with the following website: [email or web address]

## 2019-01-16 NOTE — DISCHARGE NOTE ADULT - PROVIDER TOKENS
FREE:[LAST:[Shalini],FIRST:[Morena],PHONE:[phone]],FAX:[(   [phone]    -],ADDRESS:[[place] [address]]]

## 2019-01-16 NOTE — DISCHARGE NOTE ADULT - PLAN OF CARE
Please do not take any medications that have benadryl or phenylephrine in them, such as cough syrups, because these can lower the concentration of epilepsy medications in your body and make you more prone to seizures. Please go to your appointment with your neurologist on February 15 at 1230am. You came to the hospital because you were having hand jerks since yesterday morning. You were monitored for seizures on video EEG. You were not found to have any seizures. Please do not take any medications that have benadryl or phenylephrine in them, such as cough syrups, because these can lower the concentration of epilepsy medications in your body and make you more prone to seizures. Please go to your appointment with your neurologist on February 15 at 1230pm

## 2019-01-16 NOTE — DISCHARGE NOTE ADULT - CARE PROVIDERS DIRECT ADDRESSES
,DirectAddress_Unknown
pt with psych history with [facility] currently.  Reports compliance with meds.  Most recently seen 2 weeks ago for anxiety.  Due to symptoms and history and risk factors will consult  team as well as clear medically

## 2019-01-16 NOTE — DISCHARGE NOTE ADULT - MEDICATION SUMMARY - MEDICATIONS TO STOP TAKING
I will STOP taking the medications listed below when I get home from the hospital:    Keflex 500 mg oral capsule  -- 1 cap(s) by mouth 3 times   -- Finish all this medication unless otherwise directed by prescriber.

## 2019-01-16 NOTE — DISCHARGE NOTE ADULT - ADDITIONAL INSTRUCTIONS
Please go to your appointment with your neurologist on February 15 at 1230am. Please go to your appointment with your neurologist on February 15 at 1230pm.
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
## 2019-01-17 LAB — LEVETIRACETAM SERPL-MCNC: 17.7 MCG/ML — SIGNIFICANT CHANGE UP (ref 12–46)

## 2019-01-18 DIAGNOSIS — G40.909 EPILEPSY, UNSPECIFIED, NOT INTRACTABLE, WITHOUT STATUS EPILEPTICUS: ICD-10-CM

## 2019-01-18 DIAGNOSIS — R56.9 UNSPECIFIED CONVULSIONS: ICD-10-CM

## 2019-01-18 DIAGNOSIS — Z79.899 OTHER LONG TERM (CURRENT) DRUG THERAPY: ICD-10-CM

## 2019-07-03 ENCOUNTER — EMERGENCY (EMERGENCY)
Facility: HOSPITAL | Age: 44
LOS: 1 days | Discharge: HOME | End: 2019-07-03
Admitting: EMERGENCY MEDICINE
Payer: COMMERCIAL

## 2019-07-03 VITALS
SYSTOLIC BLOOD PRESSURE: 117 MMHG | WEIGHT: 160.06 LBS | OXYGEN SATURATION: 97 % | DIASTOLIC BLOOD PRESSURE: 82 MMHG | TEMPERATURE: 98 F | HEIGHT: 64 IN | RESPIRATION RATE: 18 BRPM | HEART RATE: 75 BPM

## 2019-07-03 VITALS
OXYGEN SATURATION: 99 % | DIASTOLIC BLOOD PRESSURE: 76 MMHG | RESPIRATION RATE: 18 BRPM | TEMPERATURE: 98 F | HEART RATE: 66 BPM | SYSTOLIC BLOOD PRESSURE: 115 MMHG

## 2019-07-03 DIAGNOSIS — M79.672 PAIN IN LEFT FOOT: ICD-10-CM
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
## 2019-07-03 DIAGNOSIS — Z98.890 OTHER SPECIFIED POSTPROCEDURAL STATES: Chronic | ICD-10-CM
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
## 2019-07-03 DIAGNOSIS — S90.852A SUPERFICIAL FOREIGN BODY, LEFT FOOT, INITIAL ENCOUNTER: ICD-10-CM

## 2019-07-03 PROCEDURE — 73630 X-RAY EXAM OF FOOT: CPT

## 2019-07-03 PROCEDURE — 99283 EMERGENCY DEPT VISIT LOW MDM: CPT | Mod: 25

## 2019-07-03 PROCEDURE — 73630 X-RAY EXAM OF FOOT: CPT | Mod: 26,LT

## 2019-07-03 PROCEDURE — 28190 REMOVAL OF FOOT FOREIGN BODY: CPT | Mod: LT

## 2019-07-03 PROCEDURE — 73630 X-RAY EXAM OF FOOT: CPT | Mod: 26

## 2019-07-03 PROCEDURE — 99284 EMERGENCY DEPT VISIT MOD MDM: CPT | Mod: 25

## 2019-07-03 RX ORDER — LACOSAMIDE 50 MG/1
0 TABLET ORAL
Qty: 0 | Refills: 0 | DISCHARGE

## 2019-07-03 NOTE — ED ADULT NURSE NOTE - NSIMPLEMENTINTERV_GEN_ALL_ED
Implemented All Universal Safety Interventions:  [place] to call system. Call bell, personal items and telephone within reach. Instruct patient to call for assistance. Room bathroom lighting operational. Non-slip footwear when patient is off stretcher. Physically safe environment: no spills, clutter or unnecessary equipment. Stretcher in lowest position, wheels locked, appropriate side rails in place.
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
## 2019-07-03 NOTE — CONSULT NOTE ADULT - ASSESSMENT
43 y/o male presents to ED with foreign body left heel  -Plantar hyperkeratotic tissue debrided with sterile #10 blade revealing small portal of entry  -Area around portal of entry compressed laterally until glistening object appeared in surface, small piece of glass removed with forceps  -Wound dressed with adhesive bandage  -No signs of acute infection  -Please follow up with Dr. Sauceda in 1 week    [facility]  [address] [place] [address] [address] [place] [address] [phone]

## 2019-07-03 NOTE — ED PROVIDER NOTE - NSFOLLOWUPINSTRUCTIONS_ED_ALL_ED_FT
please monitor the wound of infection    please follow up with Dr. Sauceda (podiatrist) in 1 week     PUNCTURE WOUND - AfterCare(R) Instructions(ER/ED)     Puncture Wound    WHAT YOU NEED TO KNOW:    A puncture wound is a hole in the skin made by a sharp, pointed object. The area may be bruised or swollen. You may have bleeding, pain, or trouble moving the affected area.Puncture Wound         DISCHARGE INSTRUCTIONS:    Return to the emergency department if:     You have severe pain.      You have numbness or tingling in the area of your wound.      Your wound starts bleeding and does not stop, even after you apply pressure.    Call your doctor if:     You have new drainage or a bad odor coming from the wound.      You have a fever or chills.      You have increased swelling, redness, or pain.      You have red streaks on your skin coming from your wound.      You have questions or concerns about your condition or care.    Medicines: You may need any of the following:    NSAIDs, such as ibuprofen, help decrease swelling, pain, and fever. This medicine is available with or without a doctor's order. NSAIDs can cause stomach bleeding or kidney problems in certain people. If you take blood thinner medicine, always ask your healthcare provider if NSAIDs are safe for you. Always read the medicine label and follow directions.      Antibiotics help prevent a bacterial infection.       Take your medicine as directed. Contact your healthcare provider if you think your medicine is not helping or if you have side effects. Tell him of her if you are allergic to any medicine. Keep a list of the medicines, vitamins, and herbs you take. Include the amounts, and when and why you take them. Bring the list or the pill bottles to follow-up visits. Carry your medicine list with you in case of an emergency.    Care for your wound as directed: Keep your wound clean and dry. When you are allowed to bathe, carefully wash the wound with soap and water. Dry the area and put on new, clean bandages as directed. Change your bandages when they get wet or dirty.    Rest and elevate the injured area above the level of your heart as often as you can. This will help decrease swelling and pain. Prop your injured area on pillows or blankets to keep it elevated comfortably.     Follow up with your doctor in 2 to 3 days: Write down your questions so you remember to ask them during your visits.       © Copyright [employer] 2019 All illustrations and images included in CareNotes are the copyrighted property of [email or web address] or [employer].      back to top                      © Copyright [employer] 2019
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
## 2019-07-03 NOTE — ED PROVIDER NOTE - PHYSICAL EXAMINATION
General Appearance: Patient is well developed and well nourished. Patient is lying in stretcher, not diaphoretic and does not appear in acute distress.      Pulm: Chest expansion symmetric bilaterally without evidence of retraction.       MSK: ttp plantar calcaneous with surrounding scabbing.  No noted tenderness to palpation of the medial/lateral malleolous, anterior joint. No tenderness to palpation of the tarsals, metatarsals or phalanges of the foot. No evidence of ecchymosis, deformity or edema. Full ROM ankle flexion, extension, inversion and eversion. Full ROM hip and knee.      Neuro: Distal sensation intact in arms and legs bilaterally. Sensory Knee and ankle reflexes 2+ and symmetric bilaterally. gait steady. gcs 15    psych: mood and affect appropriate.     skin: scabbing noted mid plantar calcaneous. warm dry and intact- no note of erythema edema purpura petechia ecchymosis

## 2019-07-03 NOTE — ED PROVIDER NOTE - CARE PROVIDER_API CALL
Irvin Sauceda (ELIDIA)  Podiatric Medicine and Surgery  [address], [facility] [address]  [place] [address]  Phone: [phone]  Fax: [phone]  Follow Up Time:     Irvin Sauceda)  Podiatric Medicine and Surgery  [address] [place] [address]  Phone: [phone]  Fax: [phone]  Follow Up Time:

## 2019-07-03 NOTE — ED PROVIDER NOTE - DIAGNOSTIC INTERPRETATION
ER Physician Assistant: left foot  INTERPRETATION: luncency mid plantar calcaneous no acute fracture; no soft tissue swelling noted; normal bony alignment.

## 2019-07-03 NOTE — ED ADULT TRIAGE NOTE - CHIEF COMPLAINT QUOTE
pain to my left foot since 3-4 days, I feel like something is stuck in there; denies any injury/trauma
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
## 2019-07-03 NOTE — ED PROVIDER NOTE - CLINICAL SUMMARY MEDICAL DECISION MAKING FREE TEXT BOX
This is a pleasant 44 year old male presenting to the ed with foreign body sensation in the left foot. states that he believes he stopped on a piece of glass 2-3 days ago and the area is becoming more sensitive an painful particularly with walking. physical exam, patient appears well, non-toxic. ttp mid plantar calcaneous with associated scabbing. xray reviewed with noted lucency over the calcaneous. podiatry consuilted who will see patient in ER This is a pleasant 44 year old male presenting to the ed with foreign body sensation in the left foot. states that he believes he stopped on a piece of glass 2-3 days ago and the area is becoming more sensitive an painful particularly with walking. physical exam, patient appears well, non-toxic. ttp mid plantar calcaneous with associated scabbing. xray reviewed with noted lucency over the calcaneous. podiatry consulted who will see patient in ER and removed foreign body. do not recommend abx and patient states tetanus is up to date. encouraged to follow up with podiatry 1 week. ED evaluation and management discussed with the patient and family (if available) in detail.  Close PMD follow up encouraged.  Strict ED return instructions discussed in detail and patient given the opportunity to ask any questions about their discharge diagnosis and instructions. Patient verbalized understanding. Patient is agreeable to plan.

## 2019-07-03 NOTE — ED ADULT NURSE NOTE - OBJECTIVE STATEMENT
43 y/o male w/ hx seizure disorder presents to the ED c/o left heel pain that began 2 days ago s/p stepping on glass. Pt reports washing out foot after incident. Denies any other sx.

## 2020-10-19 NOTE — PATIENT PROFILE ADULT - ARE SIGNIFICANT INDICATORS COMPLETE.
Pt allergies and NPO status verified. Home medications reconciled. Belongings secured. Pt verbalizes understanding of pain scale, expected course of stay and plan of care. Surgical site verified with pt. IV access established. Triple AIM completed. All questions answered. Bed in low position. Call light in reach.   Yes

## 2021-05-05 ENCOUNTER — EMERGENCY (EMERGENCY)
Facility: HOSPITAL | Age: 46
LOS: 1 days | Discharge: HOME | End: 2021-05-05
Admitting: EMERGENCY MEDICINE
Payer: COMMERCIAL

## 2021-05-05 VITALS
TEMPERATURE: 98 F | HEIGHT: 64 IN | SYSTOLIC BLOOD PRESSURE: 146 MMHG | HEART RATE: 73 BPM | WEIGHT: 164.91 LBS | DIASTOLIC BLOOD PRESSURE: 102 MMHG | OXYGEN SATURATION: 98 % | RESPIRATION RATE: 16 BRPM

## 2021-05-05 DIAGNOSIS — M25.571 PAIN IN RIGHT ANKLE AND JOINTS OF RIGHT FOOT: ICD-10-CM

## 2021-05-05 DIAGNOSIS — Z98.890 OTHER SPECIFIED POSTPROCEDURAL STATES: Chronic | ICD-10-CM
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
## 2021-05-05 DIAGNOSIS — Z98.890 OTHER SPECIFIED POSTPROCEDURAL STATES: ICD-10-CM
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
## 2021-05-05 PROCEDURE — 73610 X-RAY EXAM OF ANKLE: CPT

## 2021-05-05 PROCEDURE — 99283 EMERGENCY DEPT VISIT LOW MDM: CPT | Mod: 25

## 2021-05-05 PROCEDURE — 99283 EMERGENCY DEPT VISIT LOW MDM: CPT

## 2021-05-05 PROCEDURE — 73610 X-RAY EXAM OF ANKLE: CPT | Mod: 26,RT

## 2021-05-05 NOTE — ED ADULT NURSE NOTE - OBJECTIVE STATEMENT
Received ambulatory with steady gait with chief complaints of sharp R ankle pain that radiates to R shin. Patient reports pain started Thursday and has been alternating Tylenol and Ibuprofen with some relief. Denies injury to site.     AOX4, speaking full sentences.  +PERRLA.  Patient denies chest pain, shortness of breath, difficulty breathing and any form of distress not noted.  Resps even and nonlabored. Moves all extremities. No obvious trauma/injury/deformity noted. Patient oriented to ED area. All needs attended. POC reviewed. Hourly rounding in progress.

## 2021-05-05 NOTE — ED ADULT NURSE NOTE - NSIMPLEMENTINTERV_GEN_ALL_ED
Implemented All Universal Safety Interventions:  [place] to call system. Call bell, personal items and telephone within reach. Instruct patient to call for assistance. Room bathroom lighting operational. Non-slip footwear when patient is off stretcher. Physically safe environment: no spills, clutter or unnecessary equipment. Stretcher in lowest position, wheels locked, appropriate side rails in place.

## 2021-05-05 NOTE — ED PROVIDER NOTE - MUSCULOSKELETAL, MLM
right ankle with no swelling or deformity, +mild tenderness around medial malleolus, +FROM, no ecchymosis, DP/PT 2+, strength 5/5, sensation intact distally

## 2021-05-05 NOTE — ED PROVIDER NOTE - NSFOLLOWUPINSTRUCTIONS_ED_ALL_ED_FT
R.I.C.E. Treatment    WHAT YOU NEED TO KNOW:    R.I.C.E. treatment is a 4-step process used to decrease swelling and pain caused by an injury. R.I.C.E. stands for rest, ice, compression, and elevation. R.I.C.E. should be done within 24 to 48 hours after an injury.    Ice and Elevation       Ice and Elevation         DISCHARGE INSTRUCTIONS:    Return to the emergency department if:   •Your pain is severe.      •You have severe swelling or deformity.      •You have numbness in the injured area.      Contact your healthcare provider if:   •Your pain and swelling does not go away after a few days.      •You have questions or concerns about your condition or care.      How to use R.I.C.E. treatment:   •Rest your injured area as directed. You may need to stop using, or keep weight off, the injury for 48 hours or longer. Your healthcare provider may recommend crutches or another device. Return to your usual activities as directed.       •Apply ice on your injured area for 15 to 20 minutes every 4 hours or as directed. Use an ice pack, or put crushed ice in a plastic bag. Cover it with a towel. Ice helps prevent tissue damage and decreases swelling and pain.      •Compress, or keep pressure on, the injured area. Compression will help decrease swelling and support the injured area. Use an elastic bandage, air stirrup, splint, or sling as directed. If you use an elastic bandage to wrap your injured area, make sure the bandage is not too tight.       •Elevate the injured area above the level of your heart as often as you can. This will help decrease swelling and pain. Prop the injured area on pillows or blankets to keep it elevated comfortably.       Follow up with your healthcare provider as directed: Write down your questions so you remember to ask them during your visits.

## 2021-05-05 NOTE — ED PROVIDER NOTE - PATIENT PORTAL LINK FT
You can access the FollowMyHealth Patient Portal offered by [facility] by registering at the following website: [email or web address]. By joining [facility]’s FollowMyHealth portal, you will also be able to view your health information using other applications (apps) compatible with our system.

## 2021-05-05 NOTE — ED PROVIDER NOTE - OBJECTIVE STATEMENT
46 y/o m presents c/o right ankle pain x 4 days.  Pt reports pain started after playing basketball, doesn't remember a specific injury although thinks he likely injured it while playing.  Pt taking ibuprofen for pain with improvement, has mild swelling so wants an xr to see if he has a fracture.  Denies numbness/tingling to ext, weakness, all other ROS negative.

## 2021-05-05 NOTE — ED PROVIDER NOTE - CLINICAL SUMMARY MEDICAL DECISION MAKING FREE TEXT BOX
44 y/o m presents c/o right ankle pain after playing basketball 4 days ago; ext NVI, xray shows no acute fracture, ace wrap applied, d/c with RICE instructions, NSAIDs PRN pain, f/u ortho

## 2021-08-10 ENCOUNTER — APPOINTMENT (OUTPATIENT)
Dept: ORTHOPEDIC SURGERY | Facility: CLINIC | Age: 46
End: 2021-08-10
Payer: COMMERCIAL

## 2021-08-10 VITALS — HEIGHT: 66 IN | BODY MASS INDEX: 25.71 KG/M2 | WEIGHT: 160 LBS
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
## 2021-08-10 DIAGNOSIS — M25.571 PAIN IN RIGHT ANKLE AND JOINTS OF RIGHT FOOT: ICD-10-CM

## 2021-08-10 DIAGNOSIS — G40.909 EPILEPSY, UNSPECIFIED, NOT INTRACTABLE, W/OUT STATUS EPILEPTICUS: ICD-10-CM

## 2021-08-10 DIAGNOSIS — Z78.9 OTHER SPECIFIED HEALTH STATUS: ICD-10-CM

## 2021-08-10 DIAGNOSIS — Z82.61 FAMILY HISTORY OF ARTHRITIS: ICD-10-CM

## 2021-08-10 PROCEDURE — 73590 X-RAY EXAM OF LOWER LEG: CPT | Mod: RT

## 2021-08-10 PROCEDURE — 99204 OFFICE O/P NEW MOD 45 MIN: CPT | Mod: 25

## 2021-08-10 PROCEDURE — 73630 X-RAY EXAM OF FOOT: CPT | Mod: RT

## 2021-08-10 PROCEDURE — 11055 PARING/CUTG B9 HYPRKER LES 1: CPT | Mod: RT

## 2021-08-10 RX ORDER — PHENOBARBITAL 97.2 MG/1
TABLET ORAL
Refills: 0 | Status: ACTIVE | COMMUNITY

## 2021-08-10 RX ORDER — LACOSAMIDE 10 MG/ML
200 INJECTION INTRAVENOUS
Refills: 0 | Status: ACTIVE | COMMUNITY

## 2021-08-10 RX ORDER — LEVETIRACETAM 750 MG/1
750 TABLET, FILM COATED ORAL
Refills: 0 | Status: ACTIVE | COMMUNITY

## 2021-08-10 NOTE — PROCEDURE
[de-identified] : \par The callus in the right lateral midfoot was cleaned with alcohol and then using a #10 blade scalpel the callus was shaved down particularly over the area of irritation where it looks like there is probably a deep blister forming.  The debridement was just superficial.  Vaseline applied after.
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
## 2021-08-10 NOTE — REVIEW OF SYSTEMS
Boatbound Access Code: [email or web address]  Expires: 5/31/2017 10:55 AM    Your email address is not on file at [email or web address].  Email Addresses are required for you to sign up for Boatbound, please contact [phone] to verify your personal information and to provide your email address prior to attempting to register for Boatbound.    Lesia Freeman  [address] [place] [address]    Boatbound  A secure, online tool to manage your health information     [email or web address]’s Boatbound® is a secure, online tool that connects you to your personalized health information from the privacy of your home -- day or night - making it very easy for you to manage your healthcare. Once the activation process is completed, you can even access your medical information using the Boatbound deepak, which is available for free in the Apple Deepak store or Google Play store.     To learn more about Boatbound, visit [email or web address]    There are two levels of access available (as shown below):   My Chart Features  [facility] Primary Care Doctor [facility]  Specialists [facility]  Urgent  Care Non-[facility] Primary Care Doctor   Email your healthcare team securely and privately 24/7 X X X    Manage appointments: schedule your next appointment; view details of past/upcoming appointments X      Request prescription refills. X      View recent personal medical records, including lab and immunizations X X X X   View health record, including health history, allergies, medications X X X X   Read reports about your outpatient visits, procedures, consult and ER notes X X X X   See your discharge summary, which is a recap of your hospital and/or ER visit that includes your diagnosis, lab results, and care plan X X  X     How to register for Boatbound:  Once your e-mail address has been verified, follow the following steps to sign up for Boatbound.     1. Go to  [email or web address]  2. Click on the Sign Up Now box, which takes you to the New Member Sign Up page. You will  need to provide the following information:  a. Enter your [employer] Access Code exactly as it appears at the top of this page. (You will not need to use this code after you’ve completed the sign-up process. If you do not sign up before the expiration date, you must request a new code.)   b. Enter your date of birth.   c. Enter your home email address.   d. Click Submit, and follow the next screen’s instructions.  3. Create a [employer] ID. This will be your [employer] login ID and cannot be changed, so think of one that is secure and easy to remember.  4. Create a [employer] password. You can change your password at any time.  5. Enter your Password Reset Question and Answer. This can be used at a later time if you forget your password.   6. Enter your e-mail address. This allows you to receive e-mail notifications when new information is available in [employer].  7. Click Sign Up. You can now view your health information.    For assistance activating your [employer] account, call [phone]          [Joint Pain] : joint pain [Joint Stiffness] : joint stiffness [Joint Swelling] : joint swelling

## 2021-08-10 NOTE — PHYSICAL EXAM
[Normal RLE] : Right Lower Extremity: No scars, rashes, lesions, ulcers, skin intact [Normal LLE] : Left Lower Extremity: No scars, rashes, lesions, ulcers, skin intact [de-identified] : Right foot and ankle and lower leg.\par He walks with a limp in part due to his right leg being shorter than the left but also with a varus inverted ankle walking on the outside of the right foot and walking towards his toes.\par Tight Achilles.\par The ankle is in somewhat fixed inversion and cannot be brought to neutral or any eversion passively.\par 0 degrees dorsiflexion and about 10 degrees plantarflexion of the foot.\par 0/5 anterior tibial tendon, gastrocsoleus, peroneals, posterior tibial tendon, toe flexors and extensors.\par Quads and hamstrings intact\par There is large thickened callus plantar lateral foot greatest around the fifth metatarsal which is very tender. [de-identified] : Decreased sensation right arm and leg and weakness right upper and lower extremities from hemiparesis/hemiplegia [de-identified] : \par X-rays of the right foot weightbearing 3 views ordered and done today showed no visible fractures.  Changes consistent with the deformity cavovarus\par \par X-rays right tibia and fibula AP and lateral views today showed no visible fractures.  Some productive changes at the medial malleolus possibly from the varus alignment and impingement.  Mortise is intact\par \par I had a report on x-rays of the right ankle 3 views from June 14, 2021 and according to the report that was unremarkable.
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
## 2021-09-01 ENCOUNTER — APPOINTMENT (OUTPATIENT)
Dept: ORTHOPEDIC SURGERY | Facility: CLINIC | Age: 46
End: 2021-09-01
Payer: COMMERCIAL

## 2021-09-01 DIAGNOSIS — M21.541 ACQUIRED CLUBFOOT, RIGHT FOOT: ICD-10-CM

## 2021-09-01 DIAGNOSIS — L84 CORNS AND CALLOSITIES: ICD-10-CM

## 2021-09-01 DIAGNOSIS — R29.898 OTHER SYMPTOMS AND SIGNS INVOLVING THE MUSCULOSKELETAL SYSTEM: ICD-10-CM

## 2021-09-01 DIAGNOSIS — M67.01 SHORT ACHILLES TENDON (ACQUIRED), RIGHT ANKLE: ICD-10-CM

## 2021-09-01 PROCEDURE — 99213 OFFICE O/P EST LOW 20 MIN: CPT | Mod: 25

## 2021-09-01 PROCEDURE — 11055 PARING/CUTG B9 HYPRKER LES 1: CPT | Mod: RT

## 2021-09-01 NOTE — HISTORY OF PRESENT ILLNESS
[de-identified] : 47 yo presents for f/u for his right foot pain that started about 6 months ago.  \par I had debrided callus and and gave him a lateral wedge with cut out to accommodate the lateral plantar foot prominence.\par Off-the-shelf orthotic that his podiatrist had recommended.\par He still has some pain in the lateral plantar midfoot where the bony prominence and calluses.  He is having more difficulty walking than he used to.  Walking in the sand has become almost impossible for him which is something he used to be able to do.
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
## 2021-09-01 NOTE — PROCEDURE
[de-identified] : \par Debridement of callus right lateral midfoot.  Skin was cleaned with alcohol and then a #10 blade scalpel was used to debride callus and ointment was applied to moisten the skin afterwards.  He did seem to have some relief after the debridement when walking

## 2021-09-01 NOTE — PHYSICAL EXAM
[Normal RLE] : Right Lower Extremity: No scars, rashes, lesions, ulcers, skin intact [Normal LLE] : Left Lower Extremity: No scars, rashes, lesions, ulcers, skin intact [de-identified] : Decreased sensation right arm and leg and weakness right upper and lower extremities from hemiparesis/hemiplegia [de-identified] : Right foot and ankle and lower leg.\par He walks with a limp in part due to his right leg being shorter than the left but also with a varus inverted ankle walking on the outside of the right foot and walking towards his toes.\par Tight Achilles knee flexion and extension\par The ankle is in somewhat fixed inversion and cannot be brought to neutral or any eversion passively.\par -10 degrees dorsiflexion and about 20 degrees plantarflexion of the foot.\par 0/5 anterior tibial tendon, gastrocsoleus, peroneals, posterior tibial tendon, toe flexors and extensors.\par Quads and hamstrings intact.  He can do an active straight leg raise but there is weakness with hip flexors.\par Thick  callus plantar lateral foot greatest around the fifth metatarsal which is very tender.  Dark central area consistent with some bleeding deep in the callus. [de-identified] : \par X-rays of the right foot weightbearing 3 views 8/11/21 showed no visible fractures.  Changes consistent with the deformity cavovarus\par \par X-rays right tibia and fibula AP and lateral views showed no visible fractures.  Some productive changes at the medial malleolus possibly from the varus alignment and impingement.  Mortise is intact\par \par I had a report on x-rays of the right ankle 3 views from June 14, 2021 and according to the report that was unremarkable.

## 2022-04-08 ENCOUNTER — EMERGENCY (EMERGENCY)
Facility: HOSPITAL | Age: 47
LOS: 1 days | Discharge: HOME | End: 2022-04-08
Attending: EMERGENCY MEDICINE | Admitting: EMERGENCY MEDICINE
Payer: COMMERCIAL

## 2022-04-08 VITALS
TEMPERATURE: 98 F | WEIGHT: 160.06 LBS | DIASTOLIC BLOOD PRESSURE: 83 MMHG | OXYGEN SATURATION: 98 % | RESPIRATION RATE: 18 BRPM | SYSTOLIC BLOOD PRESSURE: 137 MMHG | HEART RATE: 78 BPM | HEIGHT: 64 IN

## 2022-04-08 DIAGNOSIS — Z87.820 PERSONAL HISTORY OF TRAUMATIC BRAIN INJURY: ICD-10-CM

## 2022-04-08 DIAGNOSIS — M25.562 PAIN IN LEFT KNEE: ICD-10-CM

## 2022-04-08 DIAGNOSIS — Z98.890 OTHER SPECIFIED POSTPROCEDURAL STATES: Chronic | ICD-10-CM

## 2022-04-08 PROCEDURE — 99283 EMERGENCY DEPT VISIT LOW MDM: CPT | Mod: 25

## 2022-04-08 PROCEDURE — 73562 X-RAY EXAM OF KNEE 3: CPT

## 2022-04-08 PROCEDURE — 73562 X-RAY EXAM OF KNEE 3: CPT | Mod: 26,LT

## 2022-04-08 PROCEDURE — 99283 EMERGENCY DEPT VISIT LOW MDM: CPT

## 2022-04-08 RX ORDER — IBUPROFEN 200 MG
800 TABLET ORAL ONCE
Refills: 0 | Status: COMPLETED | OUTPATIENT
Start: 2022-04-08 | End: 2022-04-08

## 2022-04-08 RX ADMIN — Medication 800 MILLIGRAM(S): at 09:41
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
## 2022-04-08 NOTE — ED ADULT TRIAGE NOTE - CHIEF COMPLAINT QUOTE
Pt presents to ED c/o L knee pain x 2 days. Reports yesterday started hurting with no trauma/injury. This morning pain was worse, tripped on the stairs landing on buttocks. Denies head strike, LOC, use of blood thinners.

## 2022-04-08 NOTE — ED ADULT NURSE NOTE - FINAL NURSING ELECTRONIC SIGNATURE
History and Physical / Pre-Sedation Assessment    Patient:  David Kramer   :   1961     Intended Procedure:  Robotic navigational bronchoscopy for biopsies of LLL pulmonary nodule    HPI: 64 y.o. male with history new, enlarging pulmonary nodule    Past Medical History:      Diagnosis Date    Abdominal hernia     s/p repair     Aortic valve prosthesis present     CAD (coronary artery disease)     Chronic back pain greater than 3 months duration     Clostridium difficile diarrhea 10/17/2016    PCR    DDD (degenerative disc disease), lumbosacral 2013    Erectile dysfunction     GERD (gastroesophageal reflux disease)     Hyperlipidemia     Hypertension     Joint pain     Left testicular cancer ([facility] [address]     s/p abdominal resection    Myalgia and myositis, unspecified 2013    Neuropathy     OA (osteoarthritis) of knee     bilateral    Obesity, Class I, BMI 30.0-34.9 (see actual BMI)     Prolonged emergence from general anesthesia     after CABG    S/P AVR     tissue valve    S/P CABG x 2     w/AVR & primary repair of dissected ascending aorta    Type 2 diabetes mellitus without complication, without long-term current use of insulin ([facility] [address] 2021    Wears dentures       Past Surgical History:        Procedure Laterality Date    AORTA SURGERY  2004    Dr. Vanessa Mohamud - primary repair of limited ascending aortic dissection    AORTIC VALVE REPLACEMENT  2017    Dr. Sandoval Gasper - redo w/25mm Donny Husk ThermaFix model 3300 bovine pericardial bioprosthesis    AORTIC VALVE SURGERY  2004    Dr. Vanessa Mohamud - 27mm Kelton-Castelan pericardial prosthesis     BACK SURGERY      L4-S1    CARDIAC CATHETERIZATION  2017    Dr. Luis Vicente  2004    Dr. Kamala Silveira Right 2015    Dr. Farideh Kan  10/10/2016    Dr. Ailyn Doran - w/laparascopic lysis of extensive adhesions, open transverse colon resection, excision of old abd mesh adhering to colon    CORONARY ANGIOPLASTY WITH STENT PLACEMENT  07/2014    CORONARY ARTERY BYPASS GRAFT  08/27/2004    Dr. Gigi Saez - x2 (LIMA-LAD, SVG sequentially to ascending aorta & D1)    CORONARY ARTERY BYPASS GRAFT  05/30/2017    Dr. Nakul Gonzales - redo x3 (reverse AC SVG sequentially to D1, OM1 & PDA) w/explant of prior prosthetic valve & removal of embedded sternal wires x7    EMG  04/16/2012    FOOT SURGERY Left 01/24/2012    Dr. Letitia Carlson, DPM - hallux nail evulsion & exostectomy    HEMICOLECTOMY N/A 7/26/2019    ROBOTIC ASSISTED  LAPAROSCOPIC RIGHT COLECTOMY AND CHOLECYSTECTOMY performed by Janiya Hovoer MD at [address] [place] [address]  2010    multiple    LUMBAR DISCECTOMY  2012    L4-5    SHOULDER ARTHROSCOPY Right 11/21/2013    Dr. Evan Burns - w/subacromial decompression, debridement of the SLAP tear, distal clavicle excision    SOFT TISSUE TUMOR RESECTION  2001    retroperitoneal mass    TESTICLE REMOVAL Left 2001    radical orchiectomy    TRANSESOPHAGEAL ECHOCARDIOGRAM  05/30/2017    during redo CABG & AVR    TUNNELED VENOUS PORT PLACEMENT  2002    portacath        Social History:    TOBACCO:   reports that he has been smoking cigarettes. He started smoking about 74 years ago. He has a 30.00 pack-year smoking history. He has never used smokeless tobacco.  ETOH:   reports no history of alcohol use. Family History:       Problem Relation Age of Onset    Cancer Mother     Cancer Father         lung    Alzheimer's Disease Sister     Liver Cancer Brother          Allergies: Allergies   Allergen Reactions    Atorvastatin Calcium [Lipitor] Other (See Comments)     Severe headaches      Wellbutrin [Bupropion] Anxiety     patient complains of feeling anxious, irritable and \"hyped up\" on wellbutrin around 2011.     Cymbalta [Duloxetine Hcl]     Vicodin [Hydrocodone-Acetaminophen] Itching       Physical Exam:  Vital Signs: BP (!) 147/87   Pulse 63   Temp 97.3 °F (36.3 °C) (Temporal)   Resp 16   Ht 6' (1.829 m)   Wt 264 lb (119.7 kg)   SpO2 98%   BMI 35.80 kg/m²    Pulmonary:Normal  Cardiac:Normal  Abdomen:Normal    Mallampati:  3    Pre-Procedure Assessment / Plan:  ASA CLASS      II. Mild systemic disease       Level of Sedation:  General anesthesia  Post Procedure plan: Return to same level of care    I assessed the patient and find that the patient is in satisfactory condition to proceed with the planned procedure and sedation plan. There also have been no significant changes since the previous H&P. I have explained the risk, benefits, and alternatives to the procedure. The patient understands and agrees to proceed.   Yes    Alen Angelucci, MD  1:53 PM 4/8/2022 05-May-2021 08:58

## 2022-04-08 NOTE — ED PROVIDER NOTE - PATIENT PORTAL LINK FT
You can access the FollowMyHealth Patient Portal offered by  by registering at the following website: [email or web address]. By joining [employer]’s FollowMyHealth portal, you will also be able to view your health information using other applications (apps) compatible with our system.

## 2022-04-08 NOTE — ED PROVIDER NOTE - NSFOLLOWUPINSTRUCTIONS_ED_ALL_ED_FT
Take advil as needed for pain.  Rest your leg, keep elevated, apply ice.  Follow up with an orthopedist for re-evaluation and further work up and management.  Return to er for any new or worsening symptoms (worsening knee pain, swelling, redness, fever, chills, numbness, tingling, weakness...).      EnglishSpanish                                                                                       Acute Knee Pain, Adult      Many things can cause knee pain. Sometimes, knee pain is sudden (acute) and may be caused by damage, swelling, or irritation of the muscles and tissues that support your knee.    The pain often goes away on its own with time and rest. If the pain does not go away, tests may be done to find out what is causing the pain.      Follow these instructions at home:      If you have a knee sleeve or brace:      •Wear the knee sleeve or brace as told by your doctor. Take it off only as told by your doctor.    •Loosen it if your toes:  •Tingle.      •Become numb.      •Turn cold and blue.        •Keep it clean.    •If the knee sleeve or brace is not waterproof:  •Do not let it get wet.      •Cover it with a watertight covering when you take a bath or shower.        Activity     •Rest your knee.      • Do not do things that cause pain or make pain worse.      •Avoid activities where both feet leave the ground at the same time (high-impact activities). Examples are running, jumping rope, and doing jumping jacks.      •Work with a physical therapist to make a safe exercise program, as told by your doctor.        Managing pain, stiffness, and swelling    •If told, put ice on the knee. To do this:  •If you have a removable knee sleeve or brace, take it off as told by your doctor.      •Put ice in a plastic bag.      •Place a towel between your skin and the bag.      •Leave the ice on for 20 minutes, 2–3 times a day.      •Take off the ice if your skin turns bright red. This is very important. If you cannot feel pain, heat, or cold, you have a greater risk of damage to the area.        •If told, use an elastic bandage to put pressure (compression) on your injured knee.      •Raise your knee above the level of your heart while you are sitting or lying down.      •Sleep with a pillow under your knee.      General instructions     •Take over-the-counter and prescription medicines only as told by your doctor.      • Do not smoke or use any products that contain nicotine or tobacco. If you need help quitting, ask your doctor.      •If you are overweight, work with your doctor and a food expert (dietitian) to set goals to lose weight. Being overweight can make your knee hurt more.      •Watch for any changes in your symptoms.      •Keep all follow-up visits.        Contact a doctor if:    •The knee pain does not stop.      •The knee pain changes or gets worse.      •You have a fever along with knee pain.      •Your knee is red or feels warm when you touch it.      •Your knee gives out or locks up.        Get help right away if:    •Your knee swells, and the swelling gets worse.      •You cannot move your knee.      •You have very bad knee pain that does not get better with pain medicine.        Summary    •Many things can cause knee pain. The pain often goes away on its own with time and rest.      •Your doctor may do tests to find out the cause of the pain.      •Watch for any changes in your symptoms. Relieve your pain with rest, medicines, light activity, and use of ice.      •Get help right away if you cannot move your knee or your knee pain is very bad.      This information is not intended to replace advice given to you by your health care provider. Make sure you discuss any questions you have with your health care provider.      Document Revised: 06/02/2021 Document Reviewed: 06/02/2021    [employer] Patient Education © 2022 [employer]

## 2022-04-08 NOTE — ED ADULT NURSE NOTE - CHIEF COMPLAINT
[facility]  [address] [place] [address]. [id number]   - [phone]    [phone]     Consultation         PATIENT  DEMOGRAPHICS   PATIENT NAME: Cristo Musa                      PHYSICIAN: Pascual Vilchis MD  : 1949  MRN: [id number]    Subjective   SUBJECTIVE   Referring Provider: Dr Rodríguez  Reason for Consultation: esrd  History of present illness:      Mr. Musa is a 70-year gentleman with a history of end-stage renal disease on hemodialysis .  His current access is right upper extremity AV graft.  He has previous AV graft in the forearm which is removed in the past.  He has chronic right upper extremity edema.  His estimated dry weight is 98.5 kg.  His weight today is 103 kg.  He normally dialyzed for 4 hours.    Patient came here yesterday with altered mental status.  He was found to be unresponsive but then arousable after sternal rub.  He still very drowsy during the dialysis today.  He couldnt be able to get any history.  Per family he has been falling asleep at the nursing home.  He also had a similar presentation in the last admission.    Past Medical History:   Diagnosis Date   • Anemia of chronic disease    • Cataract    • CHF (congestive heart failure) (CMS/HCC)    • CKD (chronic kidney disease)    • Clostridium difficile infection    • COPD (chronic obstructive pulmonary disease) (CMS/HCC)    • Diabetes mellitus (CMS/HCC)    • Glaucoma    • Heart block    • Hepatitis C    • History of transfusion    • Hypertension    • Nephropathy, diabetic (CMS/HCC)    • Pacemaker    • Pulmonary embolism (CMS/HCC)      Past Surgical History:   Procedure Laterality Date   • ABDOMINAL SURGERY     • ARTERIOVENOUS FISTULA/SHUNT SURGERY Right     forearm loop   • ARTERIOVENOUS FISTULA/SHUNT SURGERY Left     removed past upper arm   • ARTERIOVENOUS FISTULA/SHUNT SURGERY Right 3/13/2018    Procedure: revision RIGHT forearm ARTERIOVENOUS graft  (excision), debridement, wound vac;  Surgeon: Jerson Singh MD;  Location: [facility] OR;  Service: Vascular   • ARTERIOVENOUS FISTULA/SHUNT SURGERY W/ HEMODIALYSIS CATHETER INSERTION Right 4/4/2016    Procedure: RIGHT ARM AV FISTULA DECLOT REVISION REPAIR BRACHIAL ANASTOMOTIC PSUEDOANEURYSM LEFT FEMORAL RICHARDSON FISTULOGRAM WITH ANGIOPLASTY;  Surgeon: Jerson Carpenter MD;  Location: [facility] OR 18/19;  Service:    • CATARACT EXTRACTION W/ INTRAOCULAR LENS IMPLANT Left 3/31/2017    Procedure: REMOVE CATARACT AND IMPLANT INTRAOCULAR LENS LEFT EYE;  Surgeon: Hilton Montemayor MD;  Location: [facility] OR;  Service:    • COLONOSCOPY N/A 3/12/2019    Procedure: COLONOSCOPY;  Surgeon: Flako Montoya MD;  Location: [facility] ENDOSCOPY;  Service: Gastroenterology   • ENDOSCOPY N/A 3/12/2019    Procedure: ESOPHAGOGASTRODUODENOSCOPY;  Surgeon: Flako Montoya MD;  Location: [facility] ENDOSCOPY;  Service: Gastroenterology   • EYE SURGERY     • HERNIA REPAIR     • IMPLANTABLE CARDIOVERTER DEFIBRILLATOR LEAD REPLACEMENT/POCKET REVISION Right 4/11/2016    Procedure: PACEMAKER LEADS X 3 AND BATTERY EXTRACTION WITH EXIMER LASER;  Surgeon: Vern Reeves MD;  Location: [facility] OR 18/19;  Service:    • INSERTION HEMODIALYSIS CATHETER Right 05/2015    jugular   • INTERVENTIONAL RADIOLOGY PROCEDURE Right 6/1/2017    Procedure: RIGHT dialysis fistulagram & angioplasty;  Surgeon: Jerson Singh MD;  Location: [facility] ANGIO INVASIVE LOCATION;  Service:    • INTERVENTIONAL RADIOLOGY PROCEDURE Right 8/24/2017    Procedure: IR dialysis fistulagram;  Surgeon: Jerson Singh MD;  Location: [facility] ANGIO INVASIVE LOCATION;  Service:    • INTERVENTIONAL RADIOLOGY PROCEDURE Right 11/13/2018    Procedure: IR dialysis fistulagram;  Surgeon: Jerson Singh MD;  Location: [facility] ANGIO INVASIVE LOCATION;  Service: Interventional Radiology   • PACEMAKER IMPLANTATION  2008    dual chamber [employer]  "[place]iahoma   • REMOVAL HEMODIALYSIS CATHETER Right 05/2015    femoral vein     Family History   Problem Relation Age of Onset   • COPD Sister    • Diabetes Brother    • Early death Brother    • Hyperlipidemia Brother    • Hypertension Brother    • Coronary artery disease Mother    • Diabetes Mother    • Hypertension Mother    • Stroke Other    • Other Other         Respiratory disorder     Social History     Tobacco Use   • Smoking status: Former Smoker     Types: Cigarettes   • Smokeless tobacco: Never Used   • Tobacco comment: quit 20 years ago    Substance Use Topics   • Alcohol use: No     Comment: former heavy use in his 50's, up to a fifth of liquor a day, currently no alcohol   • Drug use: No     Allergies:  Lisinopril and Motrin [ibuprofen]     REVIEW OF SYSTEMS    Review of Systems   Unable to perform ROS: Acuity of condition       Objective   OBJECTIVE   Vital Signs  Temp:  [96.5 °F (35.8 °C)-97.7 °F (36.5 °C)] 96.8 °F (36 °C)  Heart Rate:  [62-96] 64  Resp:  [18-20] 18  BP: (117-142)/(71-79) 117/73    Flowsheet Rows      First Filed Value   Admission Height  172.7 cm (68\") Documented at 03/31/2019 1700   Admission Weight  105 kg (231 lb 7.7 oz) Documented at 03/31/2019 1700           No intake/output data recorded.    PHYSICAL EXAM    Physical Exam   Constitutional: He is oriented to person, place, and time. He appears well-developed.   drowsy   HENT:   Head: Normocephalic.   Eyes: Pupils are equal, round, and reactive to light.   Cardiovascular: Normal rate, regular rhythm and normal heart sounds.   Pulmonary/Chest: Effort normal and breath sounds normal. No respiratory distress.   Abdominal: Soft. Bowel sounds are normal. There is no tenderness.   Musculoskeletal: He exhibits no edema.   Neurological: He is alert and oriented to person, place, and time.       RESULTS   Results Review:    Results from last 7 days   Lab Units 04/01/19  0119 03/31/19  2118   SODIUM mmol/L 130* 131*   POTASSIUM mmol/L 4.2 4.2 "   CHLORIDE mmol/L 86* 78*   CO2 mmol/L 25.0 28.0   BUN mg/dL 59* 57*   CREATININE mg/dL 7.44* 7.06*   CALCIUM mg/dL 8.9 9.0   BILIRUBIN mg/dL  --  1.1   ALK PHOS U/L  --  171*   ALT (SGPT) U/L  --  10   AST (SGOT) U/L  --  22   GLUCOSE mg/dL 150* 102*       Estimated Creatinine Clearance: 11 mL/min (A) (by C-G formula based on SCr of 7.44 mg/dL (H)).    Results from last 7 days   Lab Units 03/31/19  2118   MAGNESIUM mg/dL 2.3             Results from last 7 days   Lab Units 04/01/19  0119 03/31/19  1920   WBC 10*3/mm3 4.58 4.53   HEMOGLOBIN g/dL 10.3* 10.5*   PLATELETS 10*3/mm3 78* 88*              MEDICATIONS      aspirin 81 mg Oral Daily   brimonidine 1 drop Both Eyes TID   calcitriol 1.5 mcg Oral Once per day on Mon Wed Fri   cetirizine 5 mg Oral Daily   [START ON 4/3/2019] cholecalciferol 2,000 Units Oral Once per day on Mon Wed Fri   dorzolamide 1 drop Left Eye TID   escitalopram 10 mg Oral Daily   famotidine 20 mg Oral Daily   fluticasone 2 spray Nasal Daily   gabapentin 100 mg Oral Nightly   insulin aspart 0-7 Units Subcutaneous 4x Daily AC & at Bedtime   insulin detemir 25 Units Subcutaneous Nightly   latanoprost 1 drop Both Eyes Nightly   melatonin 5.25 mg Oral Nightly   sevelamer 800 mg Oral TID With Meals   sodium chloride 3 mL Intravenous Q12H   traZODone 50 mg Oral Nightly        Medications Prior to Admission   Medication Sig Dispense Refill Last Dose   • acetaminophen (TYLENOL) 500 MG tablet Take 500 mg by mouth Every 4 (Four) Hours As Needed for Mild Pain  or Fever.   Taking   • aspirin 81 MG chewable tablet Chew 81 mg Daily.   Taking   • azithromycin (ZITHROMAX) 250 MG tablet Take 1 tablet by mouth Daily. 3 tablet 0 Taking   • brimonidine (ALPHAGAN P) 0.1 % solution ophthalmic solution Administer 1 drop to both eyes 3 (Three) Times a Day.   Taking   • Cholecalciferol (VITAMIN D3) 2000 units chewable tablet Chew 2,000 Units Daily.      • cyclobenzaprine (FLEXERIL) 5 MG tablet Take 5 mg by mouth Every  8 (Eight) Hours As Needed for Muscle Spasms.   Taking   • dorzolamide (TRUSOPT) 2 % ophthalmic solution Administer 1 drop into the left eye 3 (Three) Times a Day. 1 each 12 Taking   • escitalopram (LEXAPRO) 10 MG tablet Take 1 tablet by mouth Daily. 30 tablet 3 Taking   • famotidine (PEPCID) 20 MG tablet Take 20 mg by mouth every night at bedtime.   Taking   • fluticasone (FLONASE) 50 MCG/ACT nasal spray 2 sprays into each nostril daily. Administer 2 sprays in each nostril for each dose.   Taking   • gabapentin (NEURONTIN) 100 MG capsule Take 1 capsule by mouth Every Night. 30 capsule 5 Taking   • guaiFENesin 200 MG tablet Take 400 mg by mouth Every 4 (Four) Hours As Needed for Cough.   Taking   • HYDROcodone-acetaminophen (NORCO) 5-325 MG per tablet Take 1 tablet by mouth 2 (Two) Times a Day As Needed for Severe Pain . 60 tablet 0 Taking   • insulin aspart (NovoLOG) 100 UNIT/ML injection Inject  under the skin 3 (Three) Times a Day Before Meals. Sliding scale:  = 0 units; 150-200 = 3 units; 200-250 = 6 units; 250-300 = 9 units; 300-350 = 12 units; >350 = 15 units and call MD   Taking   • insulin detemir (LEVEMIR) 100 UNIT/ML injection Inject 25 Units under the skin into the appropriate area as directed Daily.   Taking   • latanoprost (XALATAN) 0.005 % ophthalmic solution Administer 1 drop to both eyes every night.   Taking   • Loratadine 10 MG capsule Take 10 mg by mouth Every Night.   Taking   • Nutritional Supplements (NEPRO PO) Take 1 tablet by mouth Daily. On Sun, Tues, Thur, Sat   Taking   • ondansetron ODT (ZOFRAN-ODT) 4 MG disintegrating tablet Take 1 tablet by mouth Every 6 (Six) Hours As Needed for Nausea or Vomiting. 10 tablet 0 Taking   • polyethylene glycol (MIRALAX) packet Take 17 g by mouth Daily As Needed (constipation).   Taking   • sennosides-docusate sodium (SENOKOT-S) 8.6-50 MG tablet Take 2 tablets by mouth 2 (Two) Times a Day As Needed for Constipation. 60 tablet 0 Taking   • sevelamer  (RENVELA) 800 MG tablet Take 800 mg by mouth 3 (Three) Times a Day With Meals.   Taking   • traZODone (DESYREL) 50 MG tablet Take 50 mg by mouth every night at bedtime.   Taking   • melatonin 5 MG tablet tablet Take 5 mg by mouth Every Night.        Assessment/Plan   ASSESSMENT / PLAN      Uremic encephalopathy    Diabetes mellitus (CMS/HCC)    ESRD (end stage renal disease) (CMS/HCC)    Anxiety    Gastroesophageal reflux disease without esophagitis    Physical deconditioning    COPD (chronic obstructive pulmonary disease) (CMS/HCC)    Elevated brain natriuretic peptide (BNP) level    Hyponatremia    Elevated troponin    Anemia due to chronic kidney disease, on chronic dialysis (CMS/HCC)    Thrombocytopenia (CMS/HCC)    1.end-stage renal disease on hemodialysis 3 days a week Monday Wednesday Friday.  His estimated dry weight is 98.5 kg.  He is at 103 kg today and we will try to remove 4 L.  His BUN is mildly elevated but doubt it is causing uremia.  We will do 4 hours of treatment today    2.anemia of chronic kidney disease patient is chronically on mircera and his last dose was March 22.  His hemoglobin is  stable    3.secondary hyperparathyroidism/hyperphosphatemia he is chronically on VELPHORO and Renvela.  We will continue with the Renvela for now.  He is also on Sensipar on dialysis days.  He takes Calcitrol 1.5 mcg with each dialysis treatment    4.altered mental status.  Patient had a similar admission in the past and improved with Narcan.  Still drowsy.  Patient's opioids are currently on hold.    Thank you for the referral we will continue to follow the patient during his hospital stay         I discussed the patients findings and my recommendations with nursing staff and primary care team         This document has been electronically signed by Pascual Vilchis MD on April 1, 2019 11:59 AM            The patient is a 46y Male complaining of knee pain/injury.

## 2022-04-08 NOTE — ED PROVIDER NOTE - CLINICAL SUMMARY MEDICAL DECISION MAKING FREE TEXT BOX
Impression: atraumatic left knee pain since yesterday, with no associated n/t/w. AVSS. NVI. Xrays of knee neg for acute fx, dislocation, destructive lesions; + lateral patellar tilting. ED evaluation and management discussed with the patient in detail. Do not suspect septic jt/ gout. Pt advised on nsaids, RICE, ortho follow up. Strict ED return instructions discussed in detail and patient given the opportunity to ask any questions about their discharge diagnosis and instructions. Patient verbalized understanding.

## 2022-04-08 NOTE — ED PROVIDER NOTE - PHYSICAL EXAMINATION
VITAL SIGNS: I have reviewed nursing notes and confirm.  CONSTITUTIONAL: Well appearing, in no acute distress.   SKIN:  warm and dry, no acute rash.   HEAD:  normocephalic, atraumatic.  EYES: EOM intact; conjunctiva and sclera clear.  ENT: No nasal discharge; airway clear.   NECK: Supple; non tender.  CARD: S1, S2 normal; no murmurs, gallops, or rubs. Regular rate and rhythm.   RESP:  Clear to auscultation b/l, no wheezes, rales or rhonchi.  ABD: Normal bowel sounds; soft; non-distended; non-tender; no guarding/ rebound.  EXT: Normal ROM to R UE/LE, and L UE. L knee: mild tenderness to patella and medial to patella, no ertyhema or warmth to knee, FROM to joint, no calf tenderness or cords, compartments are soft, with 2+ dp/pt pulses.   NEURO: Alert, oriented, grossly unremarkable  PSYCH: Cooperative, mood and affect appropriate.

## 2022-04-08 NOTE — ED ADULT NURSE NOTE - OBJECTIVE STATEMENT
PT C/O NONTRAUMATIC LEFT KNEE PAIN THAT STARTED YESTERDAY. STATES HE HAD A SLIP AND FALL THIS MORNING, NO INJURIES FROM FALL, DID NOT HIT HEAD, NO LOC. FALL EXACERBATED LEFT KNEE PAIN. PT IS CURRENTLY AMBULATING.

## 2022-05-31 ENCOUNTER — APPOINTMENT (OUTPATIENT)
Dept: ORTHOPEDIC SURGERY | Facility: CLINIC | Age: 47
End: 2022-05-31

## 2022-06-01 NOTE — ED ADULT TRIAGE NOTE - CCCP TRG CHIEF CMPLNT
pt states Xifaxin without relief. EGD and colonoscopy to Ti wnl. Going to treat for EPI with Zenpep samples. foot pain/injury

## 2022-06-14 NOTE — PATIENT PROFILE ADULT - NSPROSPIRITUALVALUESFT_GEN_A_NUR
Lutheran W Plasty Text: The lesion was extirpated to the level of the fat with a #15 scalpel blade.  Given the location of the defect, shape of the defect and the proximity to free margins a W-plasty was deemed most appropriate for repair.  Using a sterile surgical marker, the appropriate transposition arms of the W-plasty were drawn incorporating the defect and placing the expected incisions within the relaxed skin tension lines where possible.    The area thus outlined was incised deep to adipose tissue with a #15 scalpel blade.  The skin margins were undermined to an appropriate distance in all directions utilizing iris scissors.  The opposing transposition arms were then transposed into place in opposite direction and anchored with interrupted buried subcutaneous sutures.

## 2022-07-01 ENCOUNTER — APPOINTMENT (OUTPATIENT)
Dept: ORTHOPEDIC SURGERY | Facility: CLINIC | Age: 47
End: 2022-07-01

## 2022-09-23 ENCOUNTER — EMERGENCY (EMERGENCY)
Facility: HOSPITAL | Age: 47
LOS: 1 days | Discharge: HOME | End: 2022-09-23
Admitting: STUDENT IN AN ORGANIZED HEALTH CARE EDUCATION/TRAINING PROGRAM
Payer: COMMERCIAL

## 2022-09-23 VITALS
DIASTOLIC BLOOD PRESSURE: 67 MMHG | SYSTOLIC BLOOD PRESSURE: 132 MMHG | HEART RATE: 86 BPM | OXYGEN SATURATION: 97 % | WEIGHT: 160.06 LBS | TEMPERATURE: 98 F | HEIGHT: 64 IN | RESPIRATION RATE: 15 BRPM

## 2022-09-23 DIAGNOSIS — Z98.890 OTHER SPECIFIED POSTPROCEDURAL STATES: Chronic | ICD-10-CM

## 2022-09-23 PROCEDURE — 73610 X-RAY EXAM OF ANKLE: CPT

## 2022-09-23 PROCEDURE — 73610 X-RAY EXAM OF ANKLE: CPT | Mod: 26,RT

## 2022-09-23 PROCEDURE — 99284 EMERGENCY DEPT VISIT MOD MDM: CPT | Mod: 25

## 2022-09-23 PROCEDURE — 73630 X-RAY EXAM OF FOOT: CPT | Mod: 26,RT

## 2022-09-23 PROCEDURE — 99283 EMERGENCY DEPT VISIT LOW MDM: CPT | Mod: 25

## 2022-09-23 PROCEDURE — 73630 X-RAY EXAM OF FOOT: CPT

## 2022-09-23 RX ORDER — IBUPROFEN 200 MG
600 TABLET ORAL ONCE
Refills: 0 | Status: COMPLETED | OUTPATIENT
Start: 2022-09-23 | End: 2022-09-23

## 2022-09-23 RX ADMIN — Medication 600 MILLIGRAM(S): at 10:01
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
## 2022-09-23 NOTE — ED PROVIDER NOTE - CARE PROVIDER_API CALL
Ricardo Quezada)  Orthopaedic Surgery Surgery  [address] [place] [address]  Phone: [phone]  Fax: [phone]  Follow Up Time:

## 2022-09-23 NOTE — ED PROVIDER NOTE - NS ED ROS FT
CONSTITUTIONAL: Denies fever and chills    MUSCULOSKELETAL: R ankle/foot pain    SKIN: Denies rash and pruritus.
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
## 2022-09-23 NOTE — ED PROVIDER NOTE - PHYSICAL EXAMINATION
CONSTITUTIONAL: Awake, alert.  Nontoxic, no acute distress.    MUSCULOSKELETAL:  Normal appearing extremities without obvious deformity, rash, ecchymosis, erythema.  mild swelling and minimal ttp to R lateral aspect base of ankle/dorsal foot. decreased rom to R ankle (pt states baseline), Sensation somewhat diminished R foot (baseline), motor function grossly intact.  Strong equal peripheral pulses b/l.   Cap refill < 2 b/l upper and lower ext.  All compartments soft.    SKIN: Skin in warm, dry and intact without rashes or lesions.  Appropriate color for ethnicity.    NEUROLOGICAL:  Patient is alert, oriented x person, place and time.    PSYCH: Appropriate mood and affect. Good judgment and insight.

## 2022-09-23 NOTE — ED PROVIDER NOTE - NSFOLLOWUPINSTRUCTIONS_ED_ALL_ED_FT
Thank you for visiting [facility] Emergency Department.      We saw you today for foot/ankle pain.    PAIN CONTROL:   You may take ibuprofen (Motrin, Advil) 600 mg (3 regular tablets) every 6 hours as needed for pain.  Please take with food.  Stop taking if you develop abdominal pain, dark/ bloody stools.  Do not mix with other NSAIDS (ie. Naproxen, Aleve, Celecoxib).  You may also take acetaminophen (Tylenol) 650-975mg (2-3 regular tablets) or 500-1000mg (1-2 extra strength tablets) every 6 hours as needed for pain.  Do not exceed 4000 mg in 1 day. These medications may be bought over the counter.    I recommend alternating the Ibuprofen and Tylenol so you are getting medications around the clock.  For example take the Ibuprofen, then 3 hours later take the Tylenol, then 3 hours later take the Ibuprofen, and repeat as needed.    Rest. Apply ice to affected area 20 minutes on, then 20 minutes off.  You may repeat throughout the day.  Please wear ACE wrap as instructed. Elevate affected extremity.    If your pain does not improve or worsens despite these measures, you should seek medical attention and/or may need to follow up with an orthopedist.    Please know that no emergency visit is complete without follow-up with your primary care provider in 1 week.  Please bring copies of all discharge papers and results and show to your doctor.      Please continue taking all previous medications as instructed unless we discussed otherwise.     I appreciated your patience and hope you feel better soon.     Return to ER immediately if you develop fevers, chills, chest pain, shortness of breath, worsening and/or any concerning symptoms.

## 2022-09-23 NOTE — ED ADULT NURSE NOTE - NSIMPLEMENTINTERV_GEN_ALL_ED
Implemented All Fall Risk Interventions:  [place] to call system. Call bell, personal items and telephone within reach. Instruct patient to call for assistance. Room bathroom lighting operational. Non-slip footwear when patient is off stretcher. Physically safe environment: no spills, clutter or unnecessary equipment. Stretcher in lowest position, wheels locked, appropriate side rails in place. Provide visual cue, wrist band, yellow gown, etc. Monitor gait and stability. Monitor for mental status changes and reorient to person, place, and time. Review medications for side effects contributing to fall risk. Reinforce activity limits and safety measures with patient and family.

## 2022-09-23 NOTE — ED PROVIDER NOTE - PATIENT PORTAL LINK FT
You can access the FollowMyHealth Patient Portal offered by [facility] by registering at the following website: [email or web address]. By joining [employer]’s FollowMyHealth portal, you will also be able to view your health information using other applications (apps) compatible with our system.

## 2022-09-25 DIAGNOSIS — M79.671 PAIN IN RIGHT FOOT: ICD-10-CM

## 2022-09-25 DIAGNOSIS — R56.9 UNSPECIFIED CONVULSIONS: ICD-10-CM

## 2023-05-08 ENCOUNTER — EMERGENCY (EMERGENCY)
Facility: HOSPITAL | Age: 48
LOS: 1 days | Discharge: HOME | End: 2023-05-08
Admitting: EMERGENCY MEDICINE
Payer: COMMERCIAL

## 2023-05-08 VITALS
HEIGHT: 65 IN | HEART RATE: 92 BPM | TEMPERATURE: 98 F | WEIGHT: 154.98 LBS | SYSTOLIC BLOOD PRESSURE: 131 MMHG | RESPIRATION RATE: 18 BRPM | OXYGEN SATURATION: 96 % | DIASTOLIC BLOOD PRESSURE: 85 MMHG

## 2023-05-08 DIAGNOSIS — Z98.890 OTHER SPECIFIED POSTPROCEDURAL STATES: Chronic | ICD-10-CM

## 2023-05-08 PROCEDURE — 99283 EMERGENCY DEPT VISIT LOW MDM: CPT | Mod: 25

## 2023-05-08 PROCEDURE — 99284 EMERGENCY DEPT VISIT MOD MDM: CPT

## 2023-05-08 PROCEDURE — 73610 X-RAY EXAM OF ANKLE: CPT | Mod: 26,RT

## 2023-05-08 PROCEDURE — 73610 X-RAY EXAM OF ANKLE: CPT

## 2023-05-08 RX ORDER — ACETAMINOPHEN 500 MG
1000 TABLET ORAL ONCE
Refills: 0 | Status: COMPLETED | OUTPATIENT
Start: 2023-05-08 | End: 2023-05-08

## 2023-05-08 RX ADMIN — Medication 1000 MILLIGRAM(S): at 08:56

## 2023-05-08 NOTE — ED PROVIDER NOTE - PATIENT PORTAL LINK FT
You can access the FollowMyHealth Patient Portal offered by [facility] by registering at the following website: [email or web address]. By joining [facility]’s FollowMyHealth portal, you will also be able to view your health information using other applications (apps) compatible with our system.

## 2023-05-08 NOTE — ED PROVIDER NOTE - CARE PROVIDER_API CALL
Jair Menard)  Orthopaedic Surgery  [address] [place] [address], [address]  [place] [address]  Phone: [phone]  Fax: [phone]  Follow Up Time:     Jesse Sewell)  Orthopaedic Surgery  [address] [place] [address]  Phone: [phone]  Fax: [phone]  Follow Up Time:

## 2023-05-08 NOTE — ED PROVIDER NOTE - OBJECTIVE STATEMENT
The pt is a 46 y/o M, who presents to ED c/o R ankle pain x wk, was seen at [facility] and told that it was tendonitis, here c/o persistent pain. Took an ibuprofen for pain w/some relief, has not f/u w/ortho, pain is constant and dull, aggravated w/walking. Denies trauma, fall, numbness or tingling to toes, decreased, ROM, inability to ambulate.

## 2023-05-08 NOTE — ED PROVIDER NOTE - CLINICAL SUMMARY MEDICAL DECISION MAKING FREE TEXT BOX
pt c/o atraumatic ankle pain x wk, was seen at [facility] for same and dx'd as tendonitis, here for another opinion, no bony tend and FROM, xrays neg, ace wrap applied for ambulatory comfort/support, to RICE and con't prn ibuprofen, f/u w/ortho, pt understands and agrees w/plan, strict return precautions given

## 2023-05-08 NOTE — ED PROVIDER NOTE - NSFOLLOWUPINSTRUCTIONS_ED_ALL_ED_FT
RICE Therapy for Routine Care of Injuries  Many injuries can be cared for with rest, ice, compression, and elevation (RICE therapy). This includes:  Resting the injured body part.  Putting ice on the injury.  Putting pressure (compression) on the injury.  Raising the injured part (elevation).  Using RICE therapy can help to lessen pain and swelling.  Supplies needed:  Ice.  Plastic bag.  Towel.  Elastic bandage.  Pillow or pillows to raise your injured body part.  How to care for your injury with RICE therapy  Rest  Try to rest the injured part of your body. You can go back to your normal activities when your doctor says it is okay to do them and when you can do them without pain.  If you rest the injury too much, it may not heal as well. Some injuries heal better with early movement instead of resting for too long. Ask your doctor if you should do exercises to help your injury get better.  Ice  If told, put ice on the injured area. To do this:  Put ice in a plastic bag.  Place a towel between your skin and the bag.  Leave the ice on for 20 minutes, 2–3 times a day.  Take off the ice if your skin turns bright red. This is very important. If you cannot feel pain, heat, or cold, you have a greater risk of damage to the area.  Do not put ice on your bare skin. Use ice for as many days as your doctor tells you to use it.  Compression  Put pressure on the injured area. This can be done with an elastic bandage. If this type of bandage has been put on your injury:  Follow instructions on the package the bandage came in about how to use it.  Do not wrap the bandage too tightly.  Wrap the bandage more loosely if part of your body beyond the bandage is blue, swollen, cold, painful, or loses feeling.  Take off the bandage and put it on again every 3–4 hours or as told by your doctor.  See your doctor if the bandage seems to make your problems worse.  Elevation  Raise the injured area above the level of your heart while you are sitting or lying down.  Follow these instructions at home:  If your symptoms get worse or last a long time, make a follow-up appointment with your doctor. You may need to have imaging tests, such as X-rays or an MRI.  If you have imaging tests, ask how to get your results when they are ready.  Return to your normal activities when your doctor says that it is safe.  Keep all follow-up visits.  Contact a doctor if:  You keep having pain and swelling.  Your symptoms get worse.  Get help right away if:  You have sudden, very bad pain at your injury or lower than your injury.  You have redness or more swelling around your injury.  You have tingling or numbness at your injury or lower than your injury, and it does not go away when you take off the bandage.  Summary  Many injuries can be cared for using rest, ice, compression, and elevation (RICE therapy).  You can go back to your normal activities when your doctor says it is okay and when you can do them without pain.  Put ice on the injured area as told by your doctor.  Get help if your symptoms get worse or if you keep having pain and swelling.
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
## 2023-05-08 NOTE — ED PROVIDER NOTE - MUSCULOSKELETAL, MLM
Spine appears normal, range of motion is not limited, no muscle or joint tenderness; R ankle: no swelling, no discolorations, no bony tend, achilles tendon intact, pedal pulse 2+, no tend over metatarsals, FROM, normal gait, no calf tend

## 2023-05-09 DIAGNOSIS — M25.571 PAIN IN RIGHT ANKLE AND JOINTS OF RIGHT FOOT: ICD-10-CM

## 2023-06-26 ENCOUNTER — APPOINTMENT (OUTPATIENT)
Dept: ORTHOPEDIC SURGERY | Facility: CLINIC | Age: 48
End: 2023-06-26
Payer: COMMERCIAL

## 2023-06-26 VITALS — RESPIRATION RATE: 16 BRPM | HEIGHT: 66 IN | WEIGHT: 155 LBS | BODY MASS INDEX: 24.91 KG/M2

## 2023-06-26 DIAGNOSIS — M62.469 CONTRACTURE OF MUSCLE, UNSPECIFIED LOWER LEG: ICD-10-CM

## 2023-06-26 DIAGNOSIS — G81.91 HEMIPLEGIA, UNSPECIFIED AFFECTING RIGHT DOMINANT SIDE: ICD-10-CM

## 2023-06-26 DIAGNOSIS — M62.451 CONTRACTURE OF MUSCLE, RIGHT THIGH: ICD-10-CM

## 2023-06-26 PROCEDURE — 73610 X-RAY EXAM OF ANKLE: CPT | Mod: RT

## 2023-06-26 PROCEDURE — 99214 OFFICE O/P EST MOD 30 MIN: CPT

## 2023-08-14 ENCOUNTER — EMERGENCY (EMERGENCY)
Facility: HOSPITAL | Age: 48
LOS: 1 days | Discharge: HOME | End: 2023-08-14
Admitting: STUDENT IN AN ORGANIZED HEALTH CARE EDUCATION/TRAINING PROGRAM
Payer: COMMERCIAL

## 2023-08-14 VITALS
DIASTOLIC BLOOD PRESSURE: 81 MMHG | WEIGHT: 164.02 LBS | OXYGEN SATURATION: 98 % | TEMPERATURE: 97 F | RESPIRATION RATE: 16 BRPM | SYSTOLIC BLOOD PRESSURE: 128 MMHG | HEIGHT: 65 IN | HEART RATE: 84 BPM
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
## 2023-08-14 DIAGNOSIS — M19.071 PRIMARY OSTEOARTHRITIS, RIGHT ANKLE AND FOOT: ICD-10-CM

## 2023-08-14 DIAGNOSIS — S93.401A SPRAIN OF UNSPECIFIED LIGAMENT OF RIGHT ANKLE, INITIAL ENCOUNTER: ICD-10-CM
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
## 2023-08-14 DIAGNOSIS — M25.571 PAIN IN RIGHT ANKLE AND JOINTS OF RIGHT FOOT: ICD-10-CM

## 2023-08-14 DIAGNOSIS — Z98.890 OTHER SPECIFIED POSTPROCEDURAL STATES: Chronic | ICD-10-CM

## 2023-08-14 DIAGNOSIS — G40.909 EPILEPSY, UNSPECIFIED, NOT INTRACTABLE, WITHOUT STATUS EPILEPTICUS: ICD-10-CM

## 2023-08-14 PROCEDURE — 99284 EMERGENCY DEPT VISIT MOD MDM: CPT | Mod: 25

## 2023-08-14 PROCEDURE — 73610 X-RAY EXAM OF ANKLE: CPT | Mod: 26,RT

## 2023-08-14 PROCEDURE — 73630 X-RAY EXAM OF FOOT: CPT

## 2023-08-14 PROCEDURE — 99284 EMERGENCY DEPT VISIT MOD MDM: CPT

## 2023-08-14 PROCEDURE — 73630 X-RAY EXAM OF FOOT: CPT | Mod: 26,RT

## 2023-08-14 PROCEDURE — 73610 X-RAY EXAM OF ANKLE: CPT

## 2023-08-14 RX ORDER — IBUPROFEN 200 MG
600 TABLET ORAL ONCE
Refills: 0 | Status: COMPLETED | OUTPATIENT
Start: 2023-08-14 | End: 2023-08-14

## 2023-08-14 RX ADMIN — Medication 600 MILLIGRAM(S): at 09:16

## 2023-08-14 NOTE — ED PROVIDER NOTE - PATIENT PORTAL LINK FT
You can access the FollowMyHealth Patient Portal offered by [facility] by registering at the following website: [email or web address]. By joining [facility]’s FollowMyHealth portal, you will also be able to view your health information using other applications (apps) compatible with our system.

## 2023-08-14 NOTE — ED ADULT NURSE NOTE - NS_SISCREENINGSR_GEN_ALL_ED
Negative
Patient was placed in the observation unit for evaluation of epigastric/chest pain.  Work up included i serial cardiac enzymes, labs, chest x-ray, EKG and nuclear stress test.  EKG was without acute ischemic  changes, chest x-ray was negative, troponin remained negative, stress test done this morning is interpreted as normal.  Patient reported improvement in pain with GI meds, advised to follow-up with a gastroenterologist, contact information was provided.  Advised to return to emergency room if any worsening or concerning symptoms.  Patient verbalized understanding and is amenable with the plan.

## 2023-08-14 NOTE — ED PROVIDER NOTE - NSFOLLOWUPINSTRUCTIONS_ED_ALL_ED_FT
RICE Therapy for Routine Care of Injuries  Many injuries can be cared for with rest, ice, compression, and elevation (RICE therapy). This includes:  Resting the injured body part.  Putting ice on the injury.  Putting pressure (compression) on the injury.  Raising the injured part (elevation).  Using RICE therapy can help to lessen pain and swelling.  Supplies needed:  Ice.  Plastic bag.  Towel.  Elastic bandage.  Pillow or pillows to raise your injured body part.  How to care for your injury with RICE therapy  Rest  Try to rest the injured part of your body. You can go back to your normal activities when your doctor says it is okay to do them and when you can do them without pain.  If you rest the injury too much, it may not heal as well. Some injuries heal better with early movement instead of resting for too long. Ask your doctor if you should do exercises to help your injury get better.  Ice  If told, put ice on the injured area. To do this:  Put ice in a plastic bag.  Place a towel between your skin and the bag.  Leave the ice on for 20 minutes, 2–3 times a day.  Take off the ice if your skin turns bright red. This is very important. If you cannot feel pain, heat, or cold, you have a greater risk of damage to the area.  Do not put ice on your bare skin. Use ice for as many days as your doctor tells you to use it.  Compression  Put pressure on the injured area. This can be done with an elastic bandage. If this type of bandage has been put on your injury:  Follow instructions on the package the bandage came in about how to use it.  Do not wrap the bandage too tightly.  Wrap the bandage more loosely if part of your body beyond the bandage is blue, swollen, cold, painful, or loses feeling.  Take off the bandage and put it on again every 3–4 hours or as told by your doctor.  See your doctor if the bandage seems to make your problems worse.  Elevation  Raise the injured area above the level of your heart while you are sitting or lying down.  Follow these instructions at home:  If your symptoms get worse or last a long time, make a follow-up appointment with your doctor. You may need to have imaging tests, such as X-rays or an MRI.  If you have imaging tests, ask how to get your results when they are ready.  Return to your normal activities when your doctor says that it is safe.  Keep all follow-up visits.  Contact a doctor if:  You keep having pain and swelling.  Your symptoms get worse.  Get help right away if:  You have sudden, very bad pain at your injury or lower than your injury.  You have redness or more swelling around your injury.  You have tingling or numbness at your injury or lower than your injury, and it does not go away when you take off the bandage.  Summary  Many injuries can be cared for using rest, ice, compression, and elevation (RICE therapy).  You can go back to your normal activities when your doctor says it is okay and when you can do them without pain.  Put ice on the injured area as told by your doctor.  Get help if your symptoms get worse or if you keep having pain and swelling. RICE Therapy for Routine Care of Injuries  rest, ice, elevate, use ace wrap and hard sole shoe when walking, take ibuprofen as needed for pain, follow up with your orthopedic doctor   Many injuries can be cared for with rest, ice, compression, and elevation (RICE therapy). This includes:  Resting the injured body part.  Putting ice on the injury.  Putting pressure (compression) on the injury.  Raising the injured part (elevation).  Using RICE therapy can help to lessen pain and swelling.  Supplies needed:  Ice.  Plastic bag.  Towel.  Elastic bandage.  Pillow or pillows to raise your injured body part.  How to care for your injury with RICE therapy  Rest  Try to rest the injured part of your body. You can go back to your normal activities when your doctor says it is okay to do them and when you can do them without pain.  If you rest the injury too much, it may not heal as well. Some injuries heal better with early movement instead of resting for too long. Ask your doctor if you should do exercises to help your injury get better.  Ice  If told, put ice on the injured area. To do this:  Put ice in a plastic bag.  Place a towel between your skin and the bag.  Leave the ice on for 20 minutes, 2–3 times a day.  Take off the ice if your skin turns bright red. This is very important. If you cannot feel pain, heat, or cold, you have a greater risk of damage to the area.  Do not put ice on your bare skin. Use ice for as many days as your doctor tells you to use it.  Compression  Put pressure on the injured area. This can be done with an elastic bandage. If this type of bandage has been put on your injury:  Follow instructions on the package the bandage came in about how to use it.  Do not wrap the bandage too tightly.  Wrap the bandage more loosely if part of your body beyond the bandage is blue, swollen, cold, painful, or loses feeling.  Take off the bandage and put it on again every 3–4 hours or as told by your doctor.  See your doctor if the bandage seems to make your problems worse.  Elevation  Raise the injured area above the level of your heart while you are sitting or lying down.  Follow these instructions at home:  If your symptoms get worse or last a long time, make a follow-up appointment with your doctor. You may need to have imaging tests, such as X-rays or an MRI.  If you have imaging tests, ask how to get your results when they are ready.  Return to your normal activities when your doctor says that it is safe.  Keep all follow-up visits.  Contact a doctor if:  You keep having pain and swelling.  Your symptoms get worse.  Get help right away if:  You have sudden, very bad pain at your injury or lower than your injury.  You have redness or more swelling around your injury.  You have tingling or numbness at your injury or lower than your injury, and it does not go away when you take off the bandage.  Summary  Many injuries can be cared for using rest, ice, compression, and elevation (RICE therapy).  You can go back to your normal activities when your doctor says it is okay and when you can do them without pain.  Put ice on the injured area as told by your doctor.  Get help if your symptoms get worse or if you keep having pain and swelling.

## 2023-08-14 NOTE — ED PROVIDER NOTE - MUSCULOSKELETAL, MLM
Spine appears normal, range of motion is not limited, no muscle or joint tenderness; R ankle: no swelling, no discolorations, no tend over medial malleolus, + tend over lateral malleolus, achilles tendon intact, no tend over metatarsals, pedal pulse 2+, no tib/fib tend, no knee tend, ambulatory w/limp
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
## 2023-08-14 NOTE — ED PROVIDER NOTE - OBJECTIVE STATEMENT
The pt is a 47 y/o M, who presents to ED c/o R ankle pain s/p inverting it 3 d ago. Pt states that twisted it, has been ambulating on it, took advil yest w/good relief, no pain meds taken today, c/o pain - 8/10, constant and throbbing, aggravated w/walking or moving. Denies foot pain, leg or knee pain, head injury, decreased ROM, inability to weight bare.

## 2023-08-14 NOTE — ED PROVIDER NOTE - CLINICAL SUMMARY MEDICAL DECISION MAKING FREE TEXT BOX
pt c/o r ankle pain s/p inverting it 3 d ago, able to ambulate, no deformity, neurovascular intact, suspect sprain - xray to r/o fx, ace wrap applied for ambulatory comfort/support, to RICE and con't prn ibuprofen, f/u w/ortho, pt understands and agrees w/plan, strict return precautions given pt c/o r ankle pain s/p inverting it 3 d ago, able to ambulate, no deformity, neurovascular intact, suspect sprain - xray to r/o fx, ace wrap and hard sole shoe applied for ambulatory comfort/support, to RICE and con't prn ibuprofen, f/u w/ortho, pt understands and agrees w/plan, strict return precautions given

## 2023-08-30 ENCOUNTER — APPOINTMENT (OUTPATIENT)
Dept: ORTHOPEDIC SURGERY | Facility: CLINIC | Age: 48
End: 2023-08-30

## 2023-09-15 ENCOUNTER — APPOINTMENT (OUTPATIENT)
Dept: ORTHOPEDIC SURGERY | Facility: CLINIC | Age: 48
End: 2023-09-15
Payer: COMMERCIAL

## 2023-09-15 VITALS — WEIGHT: 160 LBS | BODY MASS INDEX: 26.66 KG/M2 | HEIGHT: 65 IN

## 2023-09-15 PROCEDURE — 73562 X-RAY EXAM OF KNEE 3: CPT | Mod: 50

## 2023-09-15 PROCEDURE — 99214 OFFICE O/P EST MOD 30 MIN: CPT

## 2023-09-15 RX ORDER — CELECOXIB 200 MG/1
200 CAPSULE ORAL
Qty: 24 | Refills: 1 | Status: ACTIVE | COMMUNITY
Start: 2023-09-15 | End: 1900-01-01

## 2023-09-22 ENCOUNTER — APPOINTMENT (OUTPATIENT)
Dept: ORTHOPEDIC SURGERY | Facility: CLINIC | Age: 48
End: 2023-09-22
Payer: COMMERCIAL

## 2023-09-22 DIAGNOSIS — M25.562 PAIN IN LEFT KNEE: ICD-10-CM

## 2023-09-22 PROCEDURE — 99214 OFFICE O/P EST MOD 30 MIN: CPT

## 2023-09-22 RX ORDER — MELOXICAM 15 MG/1
15 TABLET ORAL
Qty: 65 | Refills: 2 | Status: ACTIVE | COMMUNITY
Start: 2023-09-22 | End: 1900-01-01

## 2024-02-20 NOTE — ED PROVIDER NOTE - CARE PROVIDERS DIRECT ADDRESSES
,[email or web address]
Render In Strict Bullet Format?: No
Detail Level: Zone
Continue Regimen: .\\n\\nBack and chest:\\n- Panoxyl wash back and chest daily ( keep in shower)\\n- Fabior 0.1 % topical foam - Apply a small amount to entire back twice a week at night.\\n\\nFACE\\n\\nAM\\n-wash face every morning. Cerave acne foaming cleanser-\\n-clindamycin 1.2 %(1 %base)-benzoyl peroxide 3.75 % topical gel in pump Apply a pea sized amount to face every morning.\\n-Winlevi 1 % topical cream Apply a small amount to the entire face \\n-EltaMD Tinted SPF apply a small amount to the face every morning. \\n\\n\\nPM\\n- wash face every night with Cerave acne foaming cleanser\\n-Tretinoin 0.05 % topical cream Apply a pea size amount to the face every night. Discontinue 5-7 days prior to facial.\\n-Winlevi 1 % topical cream Apply a small amount to the entire face \\n-Neutrogena hydroboost gel apply a small amount to the face every night.\\n\\n.

## 2024-03-12 NOTE — ED ADULT TRIAGE NOTE - HEIGHT IN CM
Subjective:   Patient ID: Ayla Jones is a 30 VALERIA Neri CNM        [phone]

## 2024-03-25 NOTE — ED ADULT NURSE NOTE - CAS TRG GEN SKIN CONDITION
[de-identified] : \par  EKG 9/2021: a-paced, BiV paced\par   [de-identified] : \par  ICD interrogation 4/2022: 98% BiV pacing, no VT events\par   [de-identified] : JIGAR 10/2023: severe LV dysfunction, mild MR, well functioning bioAVR  TTE 10/2023: LV 6.4 cm, LVEF 10-15%, LVOT VTI 10 cm, normal RV size/function, no significant valvular disease   JIGAR 12/2022: severe LV dysfunction, posterior MVL tethering with moderate MR and mean gradient 2 mmHg, well functioning bioAVR, mild-mod TR, moderate PI  TTE 11/2022: LV 6.7 cm, LVEF 20-25%, mild concentric LVH, bioAVR with minimal AI, mild RV dysfunction, mod-severe valvular MR (posterior leaflet appears frozen) and mean gradient 8 mmHg, moderate TR, at least moderate PI, estimated PASP 53 mmHg, dilated aorta 4.6 cm at sinus of Valsalva  TTE 5/2022 (report, unable to view images:LV 6.2 cm, LVEF 30-35%, normal RV size/function, bioAVR with mild AI, mean gradient 3 mmHg across MVr with moderate-severe MR, estimated PASP 52 mmHg, 4.6 cm aortic root  TTE 9/2021: LV 6.2 cm, LVEF 15%, LVOT VTI 7 cm, mild RV dysfunction, well functioning bioAVR, mean gradient 4 mmHg across MVr with mild-moderate MR, moderate TR, estimated PASP 31 mmHg, 4.6 cm ascending aorta  [de-identified] : [facility] 11/2023: RA 9, PA 44/21(30), PCWP 21, PA sat 58% with Lorna CO/CI 3.3/1.6,  with SVR 2230 [facility] 3/2021: normal coronary arteries   Warm

## 2024-06-13 ENCOUNTER — EMERGENCY (EMERGENCY)
Facility: HOSPITAL | Age: 49
LOS: 1 days | Discharge: HOME | End: 2024-06-13
Admitting: EMERGENCY MEDICINE
Payer: COMMERCIAL

## 2024-06-13 VITALS
TEMPERATURE: 99 F | SYSTOLIC BLOOD PRESSURE: 112 MMHG | DIASTOLIC BLOOD PRESSURE: 68 MMHG | OXYGEN SATURATION: 97 % | HEART RATE: 64 BPM | RESPIRATION RATE: 16 BRPM

## 2024-06-13 VITALS
SYSTOLIC BLOOD PRESSURE: 120 MMHG | RESPIRATION RATE: 18 BRPM | HEART RATE: 70 BPM | TEMPERATURE: 99 F | DIASTOLIC BLOOD PRESSURE: 82 MMHG | OXYGEN SATURATION: 97 %

## 2024-06-13 DIAGNOSIS — G40.909 EPILEPSY, UNSPECIFIED, NOT INTRACTABLE, WITHOUT STATUS EPILEPTICUS: ICD-10-CM

## 2024-06-13 DIAGNOSIS — Z87.820 PERSONAL HISTORY OF TRAUMATIC BRAIN INJURY: ICD-10-CM

## 2024-06-13 DIAGNOSIS — Z01.89 ENCOUNTER FOR OTHER SPECIFIED SPECIAL EXAMINATIONS: ICD-10-CM

## 2024-06-13 DIAGNOSIS — Z98.890 OTHER SPECIFIED POSTPROCEDURAL STATES: Chronic | ICD-10-CM

## 2024-06-13 LAB
ALBUMIN SERPL ELPH-MCNC: 3.7 G/DL — SIGNIFICANT CHANGE UP (ref 3.4–5)
ALP SERPL-CCNC: 109 U/L — SIGNIFICANT CHANGE UP (ref 40–120)
ALT FLD-CCNC: 40 U/L — SIGNIFICANT CHANGE UP (ref 12–42)
ANION GAP SERPL CALC-SCNC: 10 MMOL/L — SIGNIFICANT CHANGE UP (ref 9–16)
AST SERPL-CCNC: 30 U/L — SIGNIFICANT CHANGE UP (ref 15–37)
BASOPHILS # BLD AUTO: 0.04 K/UL — SIGNIFICANT CHANGE UP (ref 0–0.2)
BASOPHILS NFR BLD AUTO: 0.7 % — SIGNIFICANT CHANGE UP (ref 0–2)
BILIRUB SERPL-MCNC: 0.3 MG/DL — SIGNIFICANT CHANGE UP (ref 0.2–1.2)
BUN SERPL-MCNC: 10 MG/DL — SIGNIFICANT CHANGE UP (ref 7–23)
CALCIUM SERPL-MCNC: 8.7 MG/DL — SIGNIFICANT CHANGE UP (ref 8.5–10.5)
CHLORIDE SERPL-SCNC: 108 MMOL/L — SIGNIFICANT CHANGE UP (ref 96–108)
CO2 SERPL-SCNC: 23 MMOL/L — SIGNIFICANT CHANGE UP (ref 22–31)
CREAT SERPL-MCNC: 0.91 MG/DL — SIGNIFICANT CHANGE UP (ref 0.5–1.3)
EGFR: 105 ML/MIN/1.73M2 — SIGNIFICANT CHANGE UP
EOSINOPHIL # BLD AUTO: 0.22 K/UL — SIGNIFICANT CHANGE UP (ref 0–0.5)
EOSINOPHIL NFR BLD AUTO: 3.9 % — SIGNIFICANT CHANGE UP (ref 0–6)
GLUCOSE SERPL-MCNC: 107 MG/DL — HIGH (ref 70–99)
HCT VFR BLD CALC: 39.2 % — SIGNIFICANT CHANGE UP (ref 39–50)
HGB BLD-MCNC: 13.4 G/DL — SIGNIFICANT CHANGE UP (ref 13–17)
IMM GRANULOCYTES NFR BLD AUTO: 0 % — SIGNIFICANT CHANGE UP (ref 0–0.9)
LACTATE BLDV-MCNC: 0.8 MMOL/L — SIGNIFICANT CHANGE UP (ref 0.5–2)
LYMPHOCYTES # BLD AUTO: 1.36 K/UL — SIGNIFICANT CHANGE UP (ref 1–3.3)
LYMPHOCYTES # BLD AUTO: 23.9 % — SIGNIFICANT CHANGE UP (ref 13–44)
MCHC RBC-ENTMCNC: 30.4 PG — SIGNIFICANT CHANGE UP (ref 27–34)
MCHC RBC-ENTMCNC: 34.2 GM/DL — SIGNIFICANT CHANGE UP (ref 32–36)
MCV RBC AUTO: 88.9 FL — SIGNIFICANT CHANGE UP (ref 80–100)
MONOCYTES # BLD AUTO: 0.45 K/UL — SIGNIFICANT CHANGE UP (ref 0–0.9)
MONOCYTES NFR BLD AUTO: 7.9 % — SIGNIFICANT CHANGE UP (ref 2–14)
NEUTROPHILS # BLD AUTO: 3.59 K/UL — SIGNIFICANT CHANGE UP (ref 1.8–7.4)
NEUTROPHILS NFR BLD AUTO: 63.2 % — SIGNIFICANT CHANGE UP (ref 43–77)
PLATELET # BLD AUTO: 255 K/UL — SIGNIFICANT CHANGE UP (ref 150–400)
POTASSIUM SERPL-MCNC: 4 MMOL/L — SIGNIFICANT CHANGE UP (ref 3.5–5.3)
POTASSIUM SERPL-SCNC: 4 MMOL/L — SIGNIFICANT CHANGE UP (ref 3.5–5.3)
PROT SERPL-MCNC: 7.7 G/DL — SIGNIFICANT CHANGE UP (ref 6.4–8.2)
RBC # BLD: 4.41 M/UL — SIGNIFICANT CHANGE UP (ref 4.2–5.8)
RBC # FLD: 12.6 % — SIGNIFICANT CHANGE UP (ref 10.3–14.5)
SODIUM SERPL-SCNC: 141 MMOL/L — SIGNIFICANT CHANGE UP (ref 132–145)
WBC # BLD: 5.68 K/UL — SIGNIFICANT CHANGE UP (ref 3.8–10.5)
WBC # FLD AUTO: 5.68 K/UL — SIGNIFICANT CHANGE UP (ref 3.8–10.5)

## 2024-06-13 PROCEDURE — 99284 EMERGENCY DEPT VISIT MOD MDM: CPT

## 2024-06-13 NOTE — ED ADULT NURSE REASSESSMENT NOTE - NS ED NURSE REASSESS COMMENT FT1
Received handoff from Saniya PACHECO. patient in no acute distress. patient resting comfortably in stretcher aaox4. Seizures precautions maintained. All needs met at this time, care continues.

## 2024-06-13 NOTE — ED PROVIDER NOTE - PROGRESS NOTE DETAILS
Labs reviewed and no acute abnormalities noted; lactate and electrolytes within normal limits.  Will plan to discharge patient and recommend follow-up with his neurologist.  Patient given strict return precautions which he agrees with.  Patient stable on discharge and leaves in no acute distress.

## 2024-06-13 NOTE — ED ADULT NURSE NOTE - OBJECTIVE STATEMENT
Pt reports having his aura that he was going to have a seizure on and off all morning; pt has been compliant with keppra. Last grand mal seizure was approx 8 years ago, last focal seizure was last week. Pt is AOx4, no seizure activity at present. IV initiated out of precaution and bed alarm activated.
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
## 2024-06-13 NOTE — ED PROVIDER NOTE - IV ALTEPLASE EXCL ABS HIDDEN
[facility] Emergency Dept  [address] [address]  Phone: [phone]  Fax: [phone]                                    Keyla Aragon   MRN: [id number]    Department: [facility] Emergency Dept   Date of Visit: 12/23/2020           After Visit Summary Signature Page    I have received my discharge instructions, and my questions have been answered. I have discussed any challenges I see with this plan with the nurse or doctor.    ..........................................................................................................................................  Patient/Patient Representative Signature      ..........................................................................................................................................  Patient Representative Print Name and Relationship to Patient    ..................................................               ................................................  Date                                   Time    ..........................................................................................................................................  Reviewed by Signature/Title    ...................................................              ..............................................  Date                                               Time          22EPIC Rev 08/18        show
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
## 2024-06-13 NOTE — ED ADULT TRIAGE NOTE - CHIEF COMPLAINT QUOTE
pt biba for feeling like he might have a seizure. states that he smells coffee, which is typically a warning sign for him. endorses compliance with Keppra therapy.

## 2024-06-13 NOTE — ED PROVIDER NOTE - IV ALTEPLASE DOOR HIDDEN
Patient's daughter called in regards to this. She is requesting an update, and can be reached at [phone]. show

## 2024-06-13 NOTE — ED PROVIDER NOTE - PATIENT PORTAL LINK FT
You can access the FollowMyHealth Patient Portal offered by [facility] by registering at the following website: [email or web address]. By joining [employer]’s FollowMyHealth portal, you will also be able to view your health information using other applications (apps) compatible with our system.

## 2024-06-13 NOTE — ED PROVIDER NOTE - NS ED ROS FT
+aura  Denies fevers, chills, nausea, vomiting, diarrhea, constipation, abdominal pain, urinary symptoms, chest pain, palpitations, shortness of breath, dyspnea on exertion, syncope/near syncope, cough/URI symptoms, headache, weakness, numbness, focal deficits, visual changes, gait or balance changes, dizziness
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
## 2024-06-15 LAB
MANUAL SMEAR VERIFICATION: SIGNIFICANT CHANGE UP
NRBC # BLD: 0 /100 WBCS — SIGNIFICANT CHANGE UP (ref 0–0)
PLAT MORPH BLD: ABNORMAL
PLATELET CLUMP BLD QL SMEAR: ABNORMAL
PLATELET COUNT - ESTIMATE: NORMAL — SIGNIFICANT CHANGE UP
RBC BLD AUTO: NORMAL — SIGNIFICANT CHANGE UP

## 2024-06-16 LAB — LACOSAMIDE (VIMPAT) RESULT: 5.65 UG/ML — SIGNIFICANT CHANGE UP (ref 1–10)

## 2024-06-17 LAB — LEVETIRACETAM SERPL-MCNC: 13.1 UG/ML — SIGNIFICANT CHANGE UP (ref 10–40)

## 2024-10-02 NOTE — ED ADULT NURSE NOTE - LOCATION
1) Increase rest and fluid intake.  2) Give Tylenol as needed for fever.   3) Strep infection is considered contagious until treated for 24 hours, avoid attending school, , or work during contagious period.  4) Complete full course of antibiotics.   5) Replace toothbrush after being on the antibiotic for 48 hours to avoid reinfection   6) Return if not resolved in one week or sooner if worsening.    
ankle

## 2024-10-03 NOTE — ED ADULT TRIAGE NOTE - WEIGHT IN KG
70.3
Stair training... GOAL: In 2 weeks pt will negotiate 4 stairs independently with least restrictive device./balance training/bed mobility training/gait training/strengthening/transfer training

## 2024-10-15 ENCOUNTER — EMERGENCY (EMERGENCY)
Facility: HOSPITAL | Age: 49
LOS: 1 days | Discharge: HOME | End: 2024-10-15
Attending: EMERGENCY MEDICINE | Admitting: EMERGENCY MEDICINE
Payer: COMMERCIAL

## 2024-10-15 VITALS
HEART RATE: 91 BPM | OXYGEN SATURATION: 97 % | SYSTOLIC BLOOD PRESSURE: 129 MMHG | RESPIRATION RATE: 18 BRPM | HEIGHT: 65 IN | DIASTOLIC BLOOD PRESSURE: 89 MMHG | TEMPERATURE: 98 F | WEIGHT: 147.93 LBS

## 2024-10-15 DIAGNOSIS — R29.898 OTHER SYMPTOMS AND SIGNS INVOLVING THE MUSCULOSKELETAL SYSTEM: ICD-10-CM

## 2024-10-15 DIAGNOSIS — R20.2 PARESTHESIA OF SKIN: ICD-10-CM

## 2024-10-15 DIAGNOSIS — G40.909 EPILEPSY, UNSPECIFIED, NOT INTRACTABLE, WITHOUT STATUS EPILEPTICUS: ICD-10-CM

## 2024-10-15 DIAGNOSIS — Z87.828 PERSONAL HISTORY OF OTHER (HEALED) PHYSICAL INJURY AND TRAUMA: ICD-10-CM

## 2024-10-15 DIAGNOSIS — Z98.890 OTHER SPECIFIED POSTPROCEDURAL STATES: Chronic | ICD-10-CM

## 2024-10-15 PROCEDURE — 99282 EMERGENCY DEPT VISIT SF MDM: CPT

## 2024-10-15 PROCEDURE — 99283 EMERGENCY DEPT VISIT LOW MDM: CPT
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
## 2024-10-15 NOTE — ED PROVIDER NOTE - NSFOLLOWUPINSTRUCTIONS_ED_ALL_ED_FT
1. You were seen for  paresthesia  . A copy of any of your resulted labs, imaging, and findings have been provided to you. Make sure to view any test results that may not have yet resulted at the time of your discharge by creating a FollowMyHealth account at: [email or web address] to sign up for FollowMyHealth.   2. Continue to take your home medications as prescribed.   3. Please follow up with your primary care physician. You may call our referrals coordinator at [phone] Monday to Friday 11am-7pm for assistance with making an appointment. Or you can call [phone] to make an appointment.  4. Return to the emergency department for new, persistent, or worsening symptoms or signs, or for any concerning symptoms.   5. For your for health, you should make healthy food choices and be physically active. Also, you should not smoke or use drugs, and you should not drink alcohol in excess. Please visit [email or web address] for resources and more information.    Paresthesia    WHAT YOU NEED TO KNOW:  Paresthesia is numbness, tingling, or burning. It can happen in any part of your body, but usually occurs in your legs, feet, arms, or hands.    DISCHARGE INSTRUCTIONS:  Return to the emergency department if:   •You have severe pain along with numbness and tingling.  •Your legs suddenly become cold. You have trouble moving your legs, and they ache.  •You have increased weakness in a part of your body.  •You have uncontrolled movements.    Contact your healthcare provider or neurologist if:   •Your symptoms do not improve.  •You have symptoms in more than one part of your body.  •You have questions or concerns about your condition or care.    Manage paresthesia:   •Protect the area from injury. You may injure or burn yourself if you lose feeling in the area. Be careful when you touch anything that could be hot. Wear sturdy shoes to protect your feet. Ask about other ways to protect yourself.     •Go to physical or occupational therapy if directed. Your provider may recommend therapy if you have a condition such as carpal tunnel syndrome. A physical therapist can teach you exercises to help strengthen the area or increase your ability to move it. An occupational therapist can help you find new ways to do your daily activities.    •Manage health conditions that can cause paresthesia. Work with your diabetes specialist if you have uncontrolled diabetes. A dietitian or your healthcare provider can help you create a meal plan if you have low vitamin B levels. Your provider can help you manage your health if you have multiple sclerosis or you had a stroke. It is important to manage health conditions to stop paresthesia or prevent it from getting worse.    Follow up with your healthcare provider or neurologist as directed: Your healthcare provider may refer you to a specialist. Write down your questions so you remember to ask them during your visits.

## 2024-10-15 NOTE — ED ADULT NURSE NOTE - NSFALLUNIVINTERV_ED_ALL_ED
Bed/Stretcher in lowest position, wheels locked, appropriate side rails in place/Call bell, personal items and telephone in reach/Instruct patient to call for assistance before getting out of bed/chair/stretcher/Non-slip footwear applied when patient is off stretcher/[place] to call system/Physically safe environment - no spills, clutter or unnecessary equipment/Purposeful proactive rounding/Room/bathroom lighting operational, light cord in reach

## 2024-10-15 NOTE — ED ADULT TRIAGE NOTE - CHIEF COMPLAINT QUOTE
Pt states " I was on my way to work and I smelled something and my L hand started to be tingling". Pt reports this is not new happened before when he smells something odd. Pt A&Ox4,NAD. PMhx of seizures and epilepsy. BEFAST negative.

## 2024-10-15 NOTE — ED PROVIDER NOTE - PATIENT PORTAL LINK FT
You can access the FollowMyHealth Patient Portal offered by [facility] by registering at the following website: [email or web address]. By joining [employer]’s FollowMyHealth portal, you will also be able to view your health information using other applications (apps) compatible with our system.

## 2024-10-15 NOTE — ED ADULT NURSE NOTE - OBJECTIVE STATEMENT
50 y/o M hx epilepsy, c/o "feeling weak" while on the train earlier, L hand tingling. At this time pt endorses symptoms resolved, Pt denies new numbness, tingling, chest pain, SOB, pain, N/V/D, vision changes, dizziness, lightheadedness. PT A&Ox4, respirations even and unlabored, skin color WDL warm and dry, pt is ambulatory with a steady gait. No acute distress observed.
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
## 2025-01-31 NOTE — ED ADULT NURSE NOTE - CAS DISCH TRANSFER METHOD
1/31/2025-LVM for patient to reschedule 2/13/2025 appointment with Dr. West (Dr. West is not available on 3/13/2025) & RN Visit -DT   
Pt ambulated from ED w/o difficulty

## 2025-05-10 NOTE — ED ADULT TRIAGE NOTE - GLASGOW COMA SCALE: SCORE, MLM
Patient called saying that he had gotten a referral from his PCP to schedule ab appointment with cardiology office. Patient is asking if the office can give him a call to schedule an appointment.    15